# Patient Record
Sex: FEMALE | Race: WHITE | NOT HISPANIC OR LATINO | Employment: OTHER | URBAN - METROPOLITAN AREA
[De-identification: names, ages, dates, MRNs, and addresses within clinical notes are randomized per-mention and may not be internally consistent; named-entity substitution may affect disease eponyms.]

---

## 2020-02-26 ENCOUNTER — HOSPITAL ENCOUNTER (INPATIENT)
Facility: HOSPITAL | Age: 60
LOS: 1 days | Discharge: HOME | DRG: 300 | End: 2020-02-27
Attending: EMERGENCY MEDICINE | Admitting: INTERNAL MEDICINE
Payer: MEDICARE

## 2020-02-26 DIAGNOSIS — Z86.73 HISTORY OF STROKE: ICD-10-CM

## 2020-02-26 DIAGNOSIS — E03.4 HYPOTHYROIDISM DUE TO ACQUIRED ATROPHY OF THYROID: ICD-10-CM

## 2020-02-26 DIAGNOSIS — I82.623 ACUTE DEEP VEIN THROMBOSIS (DVT) OF OTHER VEIN OF BOTH UPPER EXTREMITIES: ICD-10-CM

## 2020-02-26 DIAGNOSIS — I82.623: Primary | ICD-10-CM

## 2020-02-26 DIAGNOSIS — D86.9 SARCOIDOSIS: ICD-10-CM

## 2020-02-26 DIAGNOSIS — D64.9 NORMOCYTIC ANEMIA: ICD-10-CM

## 2020-02-26 DIAGNOSIS — S21.101A OPEN WOUND OF RIGHT CHEST WALL, INITIAL ENCOUNTER: ICD-10-CM

## 2020-02-26 PROBLEM — E78.5 HLD (HYPERLIPIDEMIA): Status: ACTIVE | Noted: 2020-02-26

## 2020-02-26 PROBLEM — E03.9 HYPOTHYROID: Status: ACTIVE | Noted: 2020-02-26

## 2020-02-26 PROBLEM — K21.9 GERD (GASTROESOPHAGEAL REFLUX DISEASE): Status: ACTIVE | Noted: 2020-02-26

## 2020-02-26 LAB
ALBUMIN SERPL BCP-MCNC: 4.2 G/DL (ref 3.5–5.2)
ALP SERPL-CCNC: 95 U/L (ref 55–135)
ALT SERPL W/O P-5'-P-CCNC: 36 U/L (ref 10–44)
ANION GAP SERPL CALC-SCNC: 13 MMOL/L (ref 8–16)
AST SERPL-CCNC: 44 U/L (ref 10–40)
BILIRUB SERPL-MCNC: 0.2 MG/DL (ref 0.1–1)
BUN SERPL-MCNC: 18 MG/DL (ref 8–23)
CALCIUM SERPL-MCNC: 9.6 MG/DL (ref 8.7–10.5)
CHLORIDE SERPL-SCNC: 98 MMOL/L (ref 95–110)
CO2 SERPL-SCNC: 28 MMOL/L (ref 23–29)
CREAT SERPL-MCNC: 0.9 MG/DL (ref 0.5–1.4)
ERYTHROCYTE [DISTWIDTH] IN BLOOD BY AUTOMATED COUNT: 12.5 % (ref 11.5–14.5)
EST. GFR  (AFRICAN AMERICAN): >60 ML/MIN/1.73 M^2
EST. GFR  (NON AFRICAN AMERICAN): >60 ML/MIN/1.73 M^2
GLUCOSE SERPL-MCNC: 93 MG/DL (ref 70–110)
HCT VFR BLD AUTO: 36.4 % (ref 37–48.5)
HGB BLD-MCNC: 11.8 G/DL (ref 12–16)
INR PPP: 0.9 (ref 0.8–1.2)
MCH RBC QN AUTO: 30.3 PG (ref 27–31)
MCHC RBC AUTO-ENTMCNC: 32.4 G/DL (ref 32–36)
MCV RBC AUTO: 94 FL (ref 82–98)
PLATELET # BLD AUTO: 305 K/UL (ref 150–350)
PMV BLD AUTO: 9.5 FL (ref 9.2–12.9)
POTASSIUM SERPL-SCNC: 3.6 MMOL/L (ref 3.5–5.1)
PROT SERPL-MCNC: 7.7 G/DL (ref 6–8.4)
PROTHROMBIN TIME: 10.2 SEC (ref 9–12.5)
RBC # BLD AUTO: 3.89 M/UL (ref 4–5.4)
SODIUM SERPL-SCNC: 139 MMOL/L (ref 136–145)
WBC # BLD AUTO: 6 K/UL (ref 3.9–12.7)

## 2020-02-26 PROCEDURE — 80053 COMPREHEN METABOLIC PANEL: CPT

## 2020-02-26 PROCEDURE — 63600175 PHARM REV CODE 636 W HCPCS: Performed by: EMERGENCY MEDICINE

## 2020-02-26 PROCEDURE — 99285 EMERGENCY DEPT VISIT HI MDM: CPT | Mod: 25

## 2020-02-26 PROCEDURE — 85027 COMPLETE CBC AUTOMATED: CPT

## 2020-02-26 PROCEDURE — 11000001 HC ACUTE MED/SURG PRIVATE ROOM

## 2020-02-26 PROCEDURE — 25500020 PHARM REV CODE 255: Performed by: INTERNAL MEDICINE

## 2020-02-26 PROCEDURE — 96372 THER/PROPH/DIAG INJ SC/IM: CPT | Mod: 59

## 2020-02-26 PROCEDURE — 25000003 PHARM REV CODE 250: Performed by: STUDENT IN AN ORGANIZED HEALTH CARE EDUCATION/TRAINING PROGRAM

## 2020-02-26 PROCEDURE — 85610 PROTHROMBIN TIME: CPT

## 2020-02-26 RX ORDER — SODIUM CHLORIDE 0.9 % (FLUSH) 0.9 %
10 SYRINGE (ML) INJECTION
Status: DISCONTINUED | OUTPATIENT
Start: 2020-02-26 | End: 2020-02-27 | Stop reason: HOSPADM

## 2020-02-26 RX ORDER — FLUTICASONE PROPIONATE 50 MCG
1 SPRAY, SUSPENSION (ML) NASAL DAILY
Status: DISCONTINUED | OUTPATIENT
Start: 2020-02-27 | End: 2020-02-27 | Stop reason: HOSPADM

## 2020-02-26 RX ORDER — LANOLIN ALCOHOL/MO/W.PET/CERES
400 CREAM (GRAM) TOPICAL 2 TIMES DAILY
Status: DISCONTINUED | OUTPATIENT
Start: 2020-02-26 | End: 2020-02-27 | Stop reason: HOSPADM

## 2020-02-26 RX ORDER — HYDROXYCHLOROQUINE SULFATE 200 MG/1
200 TABLET, FILM COATED ORAL DAILY
Status: DISCONTINUED | OUTPATIENT
Start: 2020-02-27 | End: 2020-02-27 | Stop reason: HOSPADM

## 2020-02-26 RX ORDER — FUROSEMIDE 40 MG/1
80 TABLET ORAL DAILY
Status: DISCONTINUED | OUTPATIENT
Start: 2020-02-27 | End: 2020-02-27 | Stop reason: HOSPADM

## 2020-02-26 RX ORDER — METOLAZONE 2.5 MG/1
2.5 TABLET ORAL
Status: ON HOLD | COMMUNITY
End: 2020-02-27 | Stop reason: HOSPADM

## 2020-02-26 RX ORDER — CLOPIDOGREL BISULFATE 75 MG/1
75 TABLET ORAL DAILY
Status: DISCONTINUED | OUTPATIENT
Start: 2020-02-27 | End: 2020-02-27 | Stop reason: HOSPADM

## 2020-02-26 RX ORDER — AZELASTINE 1 MG/ML
1 SPRAY, METERED NASAL 2 TIMES DAILY
Status: DISCONTINUED | OUTPATIENT
Start: 2020-02-26 | End: 2020-02-27 | Stop reason: HOSPADM

## 2020-02-26 RX ORDER — SERTRALINE HYDROCHLORIDE 50 MG/1
50 TABLET, FILM COATED ORAL DAILY
Status: DISCONTINUED | OUTPATIENT
Start: 2020-02-27 | End: 2020-02-27 | Stop reason: HOSPADM

## 2020-02-26 RX ORDER — CLOPIDOGREL BISULFATE 75 MG/1
75 TABLET ORAL DAILY
COMMUNITY

## 2020-02-26 RX ORDER — OXYCODONE AND ACETAMINOPHEN 5; 325 MG/1; MG/1
1 TABLET ORAL EVERY 4 HOURS PRN
COMMUNITY
Start: 2020-01-18 | End: 2021-06-21

## 2020-02-26 RX ORDER — HYDROXYCHLOROQUINE SULFATE 200 MG/1
200 TABLET, FILM COATED ORAL DAILY
COMMUNITY

## 2020-02-26 RX ORDER — PANTOPRAZOLE SODIUM 40 MG/1
40 TABLET, DELAYED RELEASE ORAL DAILY
Status: DISCONTINUED | OUTPATIENT
Start: 2020-02-27 | End: 2020-02-27 | Stop reason: HOSPADM

## 2020-02-26 RX ORDER — CLOTRIMAZOLE 10 MG/1
10 LOZENGE ORAL; TOPICAL 2 TIMES DAILY
COMMUNITY
End: 2021-06-21

## 2020-02-26 RX ORDER — ENOXAPARIN SODIUM 100 MG/ML
60 INJECTION SUBCUTANEOUS
Status: COMPLETED | OUTPATIENT
Start: 2020-02-26 | End: 2020-02-26

## 2020-02-26 RX ORDER — RIBOFLAVIN (VITAMIN B2) 400 MG
400 TABLET ORAL
COMMUNITY
Start: 2019-05-01 | End: 2020-04-30

## 2020-02-26 RX ORDER — TRAMADOL HYDROCHLORIDE 50 MG/1
100 TABLET ORAL DAILY
COMMUNITY
End: 2021-06-21

## 2020-02-26 RX ORDER — EZETIMIBE 10 MG/1
10 TABLET ORAL NIGHTLY
COMMUNITY

## 2020-02-26 RX ORDER — CLONAZEPAM 1 MG/1
1 TABLET ORAL 2 TIMES DAILY PRN
COMMUNITY

## 2020-02-26 RX ORDER — PROMETHAZINE HYDROCHLORIDE 25 MG/1
25 TABLET ORAL EVERY 6 HOURS PRN
COMMUNITY
End: 2021-06-21

## 2020-02-26 RX ORDER — CETIRIZINE HYDROCHLORIDE 10 MG/1
10 TABLET ORAL NIGHTLY
COMMUNITY
End: 2021-06-21

## 2020-02-26 RX ORDER — EZETIMIBE 10 MG/1
10 TABLET ORAL NIGHTLY
Status: DISCONTINUED | OUTPATIENT
Start: 2020-02-26 | End: 2020-02-27 | Stop reason: HOSPADM

## 2020-02-26 RX ORDER — BISACODYL 5 MG
5 TABLET, DELAYED RELEASE (ENTERIC COATED) ORAL DAILY PRN
COMMUNITY

## 2020-02-26 RX ORDER — FAMOTIDINE 20 MG/1
20 TABLET, FILM COATED ORAL 2 TIMES DAILY
Status: DISCONTINUED | OUTPATIENT
Start: 2020-02-26 | End: 2020-02-27 | Stop reason: HOSPADM

## 2020-02-26 RX ORDER — ENOXAPARIN SODIUM 100 MG/ML
60 INJECTION SUBCUTANEOUS
Status: DISCONTINUED | OUTPATIENT
Start: 2020-02-26 | End: 2020-02-26

## 2020-02-26 RX ORDER — SERTRALINE HYDROCHLORIDE 100 MG/1
100 TABLET, FILM COATED ORAL DAILY
COMMUNITY

## 2020-02-26 RX ORDER — LANOLIN ALCOHOL/MO/W.PET/CERES
400 CREAM (GRAM) TOPICAL 2 TIMES DAILY
COMMUNITY
End: 2021-06-21

## 2020-02-26 RX ORDER — CYCLOSPORINE 0.5 MG/ML
1 EMULSION OPHTHALMIC 2 TIMES DAILY
COMMUNITY

## 2020-02-26 RX ORDER — TIZANIDINE HYDROCHLORIDE 2 MG/1
4 CAPSULE, GELATIN COATED ORAL 2 TIMES DAILY
COMMUNITY
End: 2021-06-21

## 2020-02-26 RX ORDER — LEVOTHYROXINE SODIUM 137 UG/1
137 TABLET ORAL DAILY
COMMUNITY
End: 2021-06-21

## 2020-02-26 RX ORDER — CLONAZEPAM 0.5 MG/1
1 TABLET ORAL 2 TIMES DAILY PRN
Status: DISCONTINUED | OUTPATIENT
Start: 2020-02-26 | End: 2020-02-27 | Stop reason: HOSPADM

## 2020-02-26 RX ORDER — CETIRIZINE HYDROCHLORIDE 10 MG/1
10 TABLET ORAL NIGHTLY
Status: DISCONTINUED | OUTPATIENT
Start: 2020-02-26 | End: 2020-02-27 | Stop reason: HOSPADM

## 2020-02-26 RX ORDER — FUROSEMIDE 40 MG/1
40 TABLET ORAL EVERY OTHER DAY
COMMUNITY

## 2020-02-26 RX ORDER — LEVOTHYROXINE SODIUM 137 UG/1
137 TABLET ORAL
Status: DISCONTINUED | OUTPATIENT
Start: 2020-02-27 | End: 2020-02-27 | Stop reason: HOSPADM

## 2020-02-26 RX ORDER — LANOLIN ALCOHOL/MO/W.PET/CERES
1000 CREAM (GRAM) TOPICAL
COMMUNITY
End: 2021-06-21

## 2020-02-26 RX ORDER — TIZANIDINE 4 MG/1
4 TABLET ORAL 2 TIMES DAILY
Status: DISCONTINUED | OUTPATIENT
Start: 2020-02-26 | End: 2020-02-27 | Stop reason: HOSPADM

## 2020-02-26 RX ORDER — POLYETHYLENE GLYCOL 3350 17 G/17G
17 POWDER, FOR SOLUTION ORAL DAILY
COMMUNITY

## 2020-02-26 RX ORDER — POLYETHYLENE GLYCOL 3350 17 G/17G
17 POWDER, FOR SOLUTION ORAL DAILY
Status: DISCONTINUED | OUTPATIENT
Start: 2020-02-27 | End: 2020-02-27 | Stop reason: HOSPADM

## 2020-02-26 RX ORDER — POTASSIUM CHLORIDE 20 MEQ/1
2 TABLET, EXTENDED RELEASE ORAL
Status: ON HOLD | COMMUNITY
End: 2020-02-27 | Stop reason: HOSPADM

## 2020-02-26 RX ORDER — CLOTRIMAZOLE 10 MG/1
10 LOZENGE ORAL; TOPICAL 2 TIMES DAILY
Status: DISCONTINUED | OUTPATIENT
Start: 2020-02-26 | End: 2020-02-27 | Stop reason: HOSPADM

## 2020-02-26 RX ORDER — HYDROXYZINE HYDROCHLORIDE 25 MG/1
25 TABLET, FILM COATED ORAL EVERY 6 HOURS PRN
Status: ON HOLD | COMMUNITY
End: 2020-02-27 | Stop reason: HOSPADM

## 2020-02-26 RX ORDER — TAMSULOSIN HYDROCHLORIDE 0.4 MG/1
2 CAPSULE ORAL DAILY
COMMUNITY

## 2020-02-26 RX ORDER — POTASSIUM CHLORIDE 1.5 G/1.58G
40 POWDER, FOR SOLUTION ORAL
Status: ON HOLD | COMMUNITY
End: 2020-02-27 | Stop reason: HOSPADM

## 2020-02-26 RX ORDER — AZELASTINE 1 MG/ML
1 SPRAY, METERED NASAL 2 TIMES DAILY
COMMUNITY

## 2020-02-26 RX ORDER — OMEPRAZOLE 20 MG/1
40 CAPSULE, DELAYED RELEASE ORAL DAILY
COMMUNITY
End: 2021-06-21

## 2020-02-26 RX ORDER — FLUTICASONE PROPIONATE 50 MCG
1 SPRAY, SUSPENSION (ML) NASAL DAILY
COMMUNITY

## 2020-02-26 RX ORDER — ENOXAPARIN SODIUM 100 MG/ML
1 INJECTION SUBCUTANEOUS
Status: DISCONTINUED | OUTPATIENT
Start: 2020-02-27 | End: 2020-02-27 | Stop reason: HOSPADM

## 2020-02-26 RX ORDER — TAMSULOSIN HYDROCHLORIDE 0.4 MG/1
0.4 CAPSULE ORAL DAILY
Status: DISCONTINUED | OUTPATIENT
Start: 2020-02-27 | End: 2020-02-27 | Stop reason: HOSPADM

## 2020-02-26 RX ADMIN — CETIRIZINE HYDROCHLORIDE 10 MG: 10 TABLET, FILM COATED ORAL at 11:02

## 2020-02-26 RX ADMIN — TIZANIDINE 4 MG: 4 TABLET ORAL at 11:02

## 2020-02-26 RX ADMIN — IOHEXOL 75 ML: 350 INJECTION, SOLUTION INTRAVENOUS at 10:02

## 2020-02-26 RX ADMIN — CLONAZEPAM 1 MG: 0.5 TABLET ORAL at 11:02

## 2020-02-26 RX ADMIN — FAMOTIDINE 20 MG: 20 TABLET ORAL at 11:02

## 2020-02-26 RX ADMIN — ENOXAPARIN SODIUM 60 MG: 100 INJECTION SUBCUTANEOUS at 09:02

## 2020-02-26 RX ADMIN — Medication 400 MG: at 11:02

## 2020-02-26 RX ADMIN — EZETIMIBE 10 MG: 10 TABLET ORAL at 11:02

## 2020-02-26 RX ADMIN — CLOTRIMAZOLE 10 MG: 10 LOZENGE ORAL; TOPICAL at 11:02

## 2020-02-27 VITALS
SYSTOLIC BLOOD PRESSURE: 132 MMHG | WEIGHT: 143.31 LBS | RESPIRATION RATE: 17 BRPM | TEMPERATURE: 97 F | OXYGEN SATURATION: 96 % | HEART RATE: 50 BPM | BODY MASS INDEX: 28.13 KG/M2 | HEIGHT: 60 IN | DIASTOLIC BLOOD PRESSURE: 61 MMHG

## 2020-02-27 PROBLEM — S21.101A OPEN WOUND OF RIGHT CHEST WALL: Status: ACTIVE | Noted: 2020-02-27

## 2020-02-27 LAB
ALBUMIN SERPL BCP-MCNC: 3.6 G/DL (ref 3.5–5.2)
ALP SERPL-CCNC: 79 U/L (ref 55–135)
ALT SERPL W/O P-5'-P-CCNC: 30 U/L (ref 10–44)
ANION GAP SERPL CALC-SCNC: 8 MMOL/L (ref 8–16)
AST SERPL-CCNC: 35 U/L (ref 10–40)
BASOPHILS # BLD AUTO: 0.05 K/UL (ref 0–0.2)
BASOPHILS NFR BLD: 1 % (ref 0–1.9)
BILIRUB SERPL-MCNC: 0.3 MG/DL (ref 0.1–1)
BUN SERPL-MCNC: 13 MG/DL (ref 8–23)
CALCIUM SERPL-MCNC: 9.2 MG/DL (ref 8.7–10.5)
CHLORIDE SERPL-SCNC: 102 MMOL/L (ref 95–110)
CO2 SERPL-SCNC: 29 MMOL/L (ref 23–29)
CREAT SERPL-MCNC: 0.8 MG/DL (ref 0.5–1.4)
DIFFERENTIAL METHOD: ABNORMAL
EOSINOPHIL # BLD AUTO: 0.4 K/UL (ref 0–0.5)
EOSINOPHIL NFR BLD: 8.1 % (ref 0–8)
ERYTHROCYTE [DISTWIDTH] IN BLOOD BY AUTOMATED COUNT: 12.4 % (ref 11.5–14.5)
EST. GFR  (AFRICAN AMERICAN): >60 ML/MIN/1.73 M^2
EST. GFR  (NON AFRICAN AMERICAN): >60 ML/MIN/1.73 M^2
ESTIMATED AVG GLUCOSE: 100 MG/DL (ref 68–131)
GLUCOSE SERPL-MCNC: 93 MG/DL (ref 70–110)
HBA1C MFR BLD HPLC: 5.1 % (ref 4–5.6)
HCT VFR BLD AUTO: 34.2 % (ref 37–48.5)
HGB BLD-MCNC: 11.4 G/DL (ref 12–16)
IMM GRANULOCYTES # BLD AUTO: 0.02 K/UL (ref 0–0.04)
IMM GRANULOCYTES NFR BLD AUTO: 0.4 % (ref 0–0.5)
LYMPHOCYTES # BLD AUTO: 1.5 K/UL (ref 1–4.8)
LYMPHOCYTES NFR BLD: 29.9 % (ref 18–48)
MAGNESIUM SERPL-MCNC: 2.4 MG/DL (ref 1.6–2.6)
MCH RBC QN AUTO: 30.6 PG (ref 27–31)
MCHC RBC AUTO-ENTMCNC: 33.3 G/DL (ref 32–36)
MCV RBC AUTO: 92 FL (ref 82–98)
MONOCYTES # BLD AUTO: 0.4 K/UL (ref 0.3–1)
MONOCYTES NFR BLD: 8.9 % (ref 4–15)
NEUTROPHILS # BLD AUTO: 2.5 K/UL (ref 1.8–7.7)
NEUTROPHILS NFR BLD: 51.7 % (ref 38–73)
NRBC BLD-RTO: 0 /100 WBC
PLATELET # BLD AUTO: 292 K/UL (ref 150–350)
PMV BLD AUTO: 9.6 FL (ref 9.2–12.9)
POTASSIUM SERPL-SCNC: 3.4 MMOL/L (ref 3.5–5.1)
PROT SERPL-MCNC: 6.6 G/DL (ref 6–8.4)
RBC # BLD AUTO: 3.72 M/UL (ref 4–5.4)
SODIUM SERPL-SCNC: 139 MMOL/L (ref 136–145)
WBC # BLD AUTO: 4.92 K/UL (ref 3.9–12.7)

## 2020-02-27 PROCEDURE — 83735 ASSAY OF MAGNESIUM: CPT

## 2020-02-27 PROCEDURE — 85025 COMPLETE CBC W/AUTO DIFF WBC: CPT

## 2020-02-27 PROCEDURE — 36415 COLL VENOUS BLD VENIPUNCTURE: CPT

## 2020-02-27 PROCEDURE — 99223 1ST HOSP IP/OBS HIGH 75: CPT | Mod: ,,, | Performed by: INTERNAL MEDICINE

## 2020-02-27 PROCEDURE — 25000003 PHARM REV CODE 250: Performed by: STUDENT IN AN ORGANIZED HEALTH CARE EDUCATION/TRAINING PROGRAM

## 2020-02-27 PROCEDURE — 80053 COMPREHEN METABOLIC PANEL: CPT

## 2020-02-27 PROCEDURE — 99223 PR INITIAL HOSPITAL CARE,LEVL III: ICD-10-PCS | Mod: ,,, | Performed by: INTERNAL MEDICINE

## 2020-02-27 PROCEDURE — 83036 HEMOGLOBIN GLYCOSYLATED A1C: CPT

## 2020-02-27 PROCEDURE — 25000242 PHARM REV CODE 250 ALT 637 W/ HCPCS: Performed by: STUDENT IN AN ORGANIZED HEALTH CARE EDUCATION/TRAINING PROGRAM

## 2020-02-27 PROCEDURE — 63600175 PHARM REV CODE 636 W HCPCS: Performed by: STUDENT IN AN ORGANIZED HEALTH CARE EDUCATION/TRAINING PROGRAM

## 2020-02-27 RX ORDER — POTASSIUM CHLORIDE 20 MEQ/1
40 TABLET, EXTENDED RELEASE ORAL ONCE
Status: COMPLETED | OUTPATIENT
Start: 2020-02-27 | End: 2020-02-27

## 2020-02-27 RX ORDER — SODIUM,POTASSIUM PHOSPHATES 280-250MG
1 POWDER IN PACKET (EA) ORAL ONCE
Status: DISCONTINUED | OUTPATIENT
Start: 2020-02-27 | End: 2020-02-27

## 2020-02-27 RX ORDER — ACETAMINOPHEN 325 MG/1
650 TABLET ORAL EVERY 6 HOURS PRN
Status: DISCONTINUED | OUTPATIENT
Start: 2020-02-27 | End: 2020-02-27 | Stop reason: HOSPADM

## 2020-02-27 RX ORDER — POTASSIUM CHLORIDE 20 MEQ/1
20 TABLET, EXTENDED RELEASE ORAL 2 TIMES DAILY
Start: 2020-02-27 | End: 2021-06-21

## 2020-02-27 RX ORDER — ENOXAPARIN SODIUM 100 MG/ML
1 INJECTION SUBCUTANEOUS EVERY 12 HOURS
Start: 2020-02-27 | End: 2020-05-28 | Stop reason: ALTCHOICE

## 2020-02-27 RX ADMIN — SERTRALINE HYDROCHLORIDE 50 MG: 50 TABLET ORAL at 09:02

## 2020-02-27 RX ADMIN — ACETAMINOPHEN 650 MG: 325 TABLET ORAL at 09:02

## 2020-02-27 RX ADMIN — LEVOTHYROXINE SODIUM 137 MCG: 137 TABLET ORAL at 05:02

## 2020-02-27 RX ADMIN — FUROSEMIDE 80 MG: 40 TABLET ORAL at 09:02

## 2020-02-27 RX ADMIN — AZELASTINE HYDROCHLORIDE 137 MCG: 137 SPRAY, METERED NASAL at 09:02

## 2020-02-27 RX ADMIN — HYDROXYCHLOROQUINE SULFATE 200 MG: 200 TABLET, FILM COATED ORAL at 09:02

## 2020-02-27 RX ADMIN — TAMSULOSIN HYDROCHLORIDE 0.4 MG: 0.4 CAPSULE ORAL at 09:02

## 2020-02-27 RX ADMIN — ENOXAPARIN SODIUM 70 MG: 100 INJECTION SUBCUTANEOUS at 09:02

## 2020-02-27 RX ADMIN — POTASSIUM CHLORIDE 40 MEQ: 1500 TABLET, EXTENDED RELEASE ORAL at 09:02

## 2020-02-27 RX ADMIN — FAMOTIDINE 20 MG: 20 TABLET ORAL at 09:02

## 2020-02-27 RX ADMIN — FLUTICASONE PROPIONATE 50 MCG: 50 SPRAY, METERED NASAL at 09:02

## 2020-02-27 RX ADMIN — POLYETHYLENE GLYCOL 3350 17 G: 17 POWDER, FOR SOLUTION ORAL at 09:02

## 2020-02-27 RX ADMIN — CLOPIDOGREL BISULFATE 75 MG: 75 TABLET ORAL at 09:02

## 2020-02-27 RX ADMIN — PANTOPRAZOLE SODIUM 40 MG: 40 TABLET, DELAYED RELEASE ORAL at 09:02

## 2020-02-27 RX ADMIN — Medication 400 MG: at 09:02

## 2020-02-27 RX ADMIN — TIZANIDINE 4 MG: 4 TABLET ORAL at 09:02

## 2020-02-27 RX ADMIN — CLOTRIMAZOLE 10 MG: 10 LOZENGE ORAL; TOPICAL at 09:02

## 2020-02-27 NOTE — PROGRESS NOTES
Wound care nurse consult for right chest wall wound.  See consult note also, assessment below.     02/27/20 1100        Wound 02/27/20 0930 Non pressure chronic ulcer upper Chest   Date First Assessed/Time First Assessed: 02/27/20 0930   Pre-existing: Yes  Primary Wound Type: Non pressure chronic ulcer  Side: Right  Orientation: upper  Location: Chest   Wound WDL ex   Dressing Appearance Intact   Drainage Amount Small   Drainage Characteristics/Odor No odor;Creamy;Green   Appearance Red;Moist  (hypergranulated)   Tissue loss description Partial thickness   Red (%), Wound Tissue Color 100 %   Periwound Area Pink;Intact   Wound Edges Open;Defined   Wound Length (cm) 1 cm   Wound Width (cm) 2 cm   Wound Depth (cm) 0.5 cm   Wound Volume (cm^3) 1 cm^3   Wound Surface Area (cm^2) 2 cm^2   Care Cleansed with:;Other (see comments)  ([place])   Dressing Hydrofiber   Dressing Change Due 02/28/20

## 2020-02-27 NOTE — SUBJECTIVE & OBJECTIVE
Past Medical History:   Diagnosis Date    Aicd lead infection     Aortic coarctation     Bicuspid aortic valve     Bradycardia     Cardiac arrest 2001    Migraine headache     Neuromuscular dysfunction of bladder     Sarcoidosis     Stroke        History reviewed. No pertinent surgical history.    Review of patient's allergies indicates:   Allergen Reactions    Butorphanol tartrate Other (See Comments)    Erythromycin base     Gabapentin Other (See Comments)    Carbamazepine Palpitations    Epinephrine Palpitations    Erythromycin Nausea Only    Statins-hmg-coa reductase inhibitors Rash       No current facility-administered medications on file prior to encounter.      Current Outpatient Medications on File Prior to Encounter   Medication Sig    azelastine (ASTELIN) 137 mcg (0.1 %) nasal spray 1 spray 2 (two) times daily.    bisacodyL (DULCOLAX) 5 mg EC tablet Take 5 mg by mouth daily as needed.    cetirizine (ZYRTEC) 10 MG tablet Take 10 mg by mouth every evening.    clonazePAM (KLONOPIN) 1 MG tablet Take 1 mg by mouth 2 (two) times daily as needed.    clopidogreL (PLAVIX) 75 mg tablet Take 75 mg by mouth once daily.    clotrimazole (MYCELEX) 10 mg antelmo Take 10 mg by mouth 2 (two) times daily.    cyanocobalamin (VITAMIN B-12) 1000 MCG tablet 1,000 mcg every 30 days. 1ml IM monthly    cycloSPORINE (RESTASIS) 0.05 % ophthalmic emulsion 1 drop 2 (two) times daily.    ezetimibe (ZETIA) 10 mg tablet Take 10 mg by mouth every evening.    fluticasone propionate (FLONASE) 50 mcg/actuation nasal spray 1 spray once daily.    furosemide (LASIX) 80 MG tablet Take 80 mg by mouth once daily.    hydroxychloroquine (PLAQUENIL) 200 mg tablet Take 200 mg by mouth once daily.    hydrOXYzine HCl (ATARAX) 25 MG tablet Take 25 mg by mouth every 6 (six) hours as needed.    levothyroxine (SYNTHROID) 137 MCG Tab tablet Take 137 mcg by mouth once daily.    magnesium oxide (MAG-OX) 400 mg (241.3 mg magnesium)  tablet Take 400 mg by mouth 2 (two) times daily.    metOLazone (ZAROXOLYN) 2.5 MG tablet Take 2.5 mg by mouth. Give on Tuesday and Saturday only    omeprazole (PRILOSEC) 20 MG capsule Take 40 mg by mouth once daily.     oxyCODONE-acetaminophen (PERCOCET) 5-325 mg per tablet Take 1 tablet by mouth every 4 (four) hours as needed.    polyethylene glycol (GLYCOLAX) 17 gram/dose powder Take 17 g by mouth once daily.    potassium chloride (KLOR-CON) 20 mEq Pack Take 40 mEq by mouth. TID on Tuesday and Saturday    potassium chloride SA (K-DUR,KLOR-CON) 20 MEQ tablet Take 2 tablets by mouth. Every Monday, Wednesday, Friday, Sunday    promethazine (PHENERGAN) 25 MG tablet Take 25 mg by mouth every 6 (six) hours as needed.    ranitidine (ZANTAC) 300 MG tablet 300 mg every evening.    riboflavin, vitamin B2, 400 mg Tab Take 400 mg by mouth.    sertraline (ZOLOFT) 50 MG tablet Take 50 mg by mouth once daily.    tamsulosin (FLOMAX) 0.4 mg Cap 2 capsules once daily.    tiZANidine 2 mg Cap Take 4 mg by mouth 2 (two) times daily.    traMADol (ULTRAM) 50 mg tablet Take 100 mg by mouth once daily.    VITAMIN B COMPLEX ORAL Take 1 tablet by mouth once daily.     Family History     Problem Relation (Age of Onset)    Cancer Mother    Heart disease Father        Tobacco Use    Smoking status: Never Smoker    Smokeless tobacco: Never Used   Substance and Sexual Activity    Alcohol use: Never     Frequency: Never    Drug use: Never    Sexual activity: Not on file     Review of Systems   Constitution: Negative. Negative for diaphoresis.   HENT: Negative.    Eyes: Negative.    Cardiovascular: Negative for chest pain, irregular heartbeat, leg swelling, near-syncope, orthopnea, palpitations, paroxysmal nocturnal dyspnea and syncope.   Respiratory: Negative for cough and shortness of breath.    Endocrine: Negative.    Hematologic/Lymphatic: Negative for bleeding problem. Does not bruise/bleed easily.   Skin: Negative for  color change.   Musculoskeletal: Positive for arthritis and myalgias.   Gastrointestinal: Negative.  Negative for bloating, hematemesis, hematochezia, nausea and vomiting.   Genitourinary: Negative.    Neurological: Negative.    Psychiatric/Behavioral: Negative.    Allergic/Immunologic: Negative.      Objective:     Vital Signs (Most Recent):  Temp: 97 °F (36.1 °C) (02/27/20 0821)  Pulse: (!) 52 (02/27/20 1000)  Resp: 18 (02/27/20 0821)  BP: (!) 140/82 (02/27/20 1000)  SpO2: 96 % (02/26/20 2202) Vital Signs (24h Range):  Temp:  [97 °F (36.1 °C)-98.3 °F (36.8 °C)] 97 °F (36.1 °C)  Pulse:  [50-63] 52  Resp:  [14-25] 18  SpO2:  [96 %-100 %] 96 %  BP: (100-173)/(56-82) 140/82     Weight: 65 kg (143 lb 4.8 oz)  Body mass index is 27.99 kg/m².    SpO2: 96 %  O2 Device (Oxygen Therapy): room air      Intake/Output Summary (Last 24 hours) at 2/27/2020 1107  Last data filed at 2/27/2020 0543  Gross per 24 hour   Intake 205 ml   Output 400 ml   Net -195 ml       Lines/Drains/Airways     Peripheral Intravenous Line                 Peripheral IV - Single Lumen 02/26/20 1855 20 G Left Forearm less than 1 day                Physical Exam   Constitutional: She is oriented to person, place, and time. She appears well-developed and well-nourished. No distress.   HENT:   Head: Atraumatic.   Eyes: Right eye exhibits no discharge. Left eye exhibits no discharge.   Neck: No JVD present.   Cardiovascular: Normal rate and regular rhythm. Exam reveals no gallop and no friction rub.   Murmur heard.  Pulmonary/Chest: Effort normal and breath sounds normal.   Abdominal: Soft. Bowel sounds are normal.   Musculoskeletal: She exhibits edema (mild posterior RUE).   Neurological: She is alert and oriented to person, place, and time.   Skin: Skin is warm and dry. She is not diaphoretic.   Right chest wall wound    Psychiatric: She has a normal mood and affect. Her behavior is normal.       Significant Labs:   BMP:   Recent Labs   Lab 02/26/20  8505  02/27/20  0423   GLU 93 93    139   K 3.6 3.4*   CL 98 102   CO2 28 29   BUN 18 13   CREATININE 0.9 0.8   CALCIUM 9.6 9.2   MG  --  2.4   , CMP   Recent Labs   Lab 02/26/20 1848 02/27/20  0423    139   K 3.6 3.4*   CL 98 102   CO2 28 29   GLU 93 93   BUN 18 13   CREATININE 0.9 0.8   CALCIUM 9.6 9.2   PROT 7.7 6.6   ALBUMIN 4.2 3.6   BILITOT 0.2 0.3   ALKPHOS 95 79   AST 44* 35   ALT 36 30   ANIONGAP 13 8   ESTGFRAFRICA >60 >60   EGFRNONAA >60 >60   , CBC   Recent Labs   Lab 02/26/20 1848 02/27/20 0422   WBC 6.00 4.92   HGB 11.8* 11.4*   HCT 36.4* 34.2*    292   , INR   Recent Labs   Lab 02/26/20 1848   INR 0.9   , Lipid Panel No results for input(s): CHOL, HDL, LDLCALC, TRIG, CHOLHDL in the last 48 hours., Troponin No results for input(s): TROPONINI in the last 48 hours. and All pertinent lab results from the last 24 hours have been reviewed.    Significant Imaging: Echocardiogram: 2D echo with color flow doppler: No results found for this or any previous visit. and Transthoracic echo (TTE) complete (Cupid Only): No results found for this or any previous visit.

## 2020-02-27 NOTE — PLAN OF CARE
Patient A&O x 4. C/o pain right upper arm. PRN tylenol given. With order for discharge. PIV removed. Report given to nurse Shaylee.

## 2020-02-27 NOTE — ASSESSMENT & PLAN NOTE
Patient with hypercoagulable state followed by hematology in the past  DVT to BUE in setting of Left then Right midlines for abx administration- feel her clots are subacute given timing of lines  Patient mostly asymptomatic and swelling was noted incidentally by another NH resident. Patient has had pain to her right shoulder since her ICD was removed and she has a persistent right chest wall wound  No indication for catheter directed thrombolysis or clot retrieval   INR difficult to regulate with coumadin in the past  Heparin not the most ideal choice as outpatient  Recommend initiation of DOAC- Eliquis vs Xarelto   Refer back to hematology as an outpatient to determine duration of treatment, feel she will likely need lifelong anticoagulation

## 2020-02-27 NOTE — PLAN OF CARE
"Admit completed per flowsheet. Patient's questions answered.  Pt instructed on VTE, diet, I&Os, tests, fall precautions and medications. Understanding verbalized. Patient has complaint of pain in her right upper arm rated a "4" at this time.  " "I need to have my thumb re evaluated"  unable to follow up with ortho outpatient- c/o throbbing pain to right thumb on 4/10

## 2020-02-27 NOTE — ED PROVIDER NOTES
"Encounter Date: 2/26/2020       History     Chief Complaint   Patient presents with    Deep Vein Thrombosis     pt was sent from [facility] after ultrasound that was performed on Monday showed DVT on her right arm.      Nishi Pérez is a 59 y.o. female who  has a past medical history of Aicd lead infection, Aortic coarctation, Bicuspid aortic valve, Bradycardia, Cardiac arrest (2001), Migraine headache, Neuromuscular dysfunction of bladder, Sarcoidosis, and Stroke.    The patient presents to the ED due to R arm pain and swelling.   Patient reports symptoms started over the weekend, about 5 days ago.   She reports multiple recent admissions to the [facility] and [facility] due to an infected pacemaker lead.  She had the pacemaker removed, and received IV antibiotics through a PICC line to the left arm over the last several weeks.  We PICC line was removed a few weeks ago.  She denies any associated rash, chest pain, shortness of breath, abdominal pain, fever, nausea/vomiting, leg pain/swelling, or any other associated symptoms.  She has a history of stroke, and was previously on "heparin shots" but is currently only on Plavix.  She was admitted to a nursing home after discharge from the hospital.  She underwent an ultrasound of her right arm, which showed a DVT.  She was sent to the ER for further management.  She denies any other complaints currently.        Review of patient's allergies indicates:   Allergen Reactions    Butorphanol tartrate Other (See Comments)    Erythromycin base     Gabapentin Other (See Comments)    Carbamazepine Palpitations    Epinephrine Palpitations    Erythromycin Nausea Only    Statins-hmg-coa reductase inhibitors Rash     Past Medical History:   Diagnosis Date    Aicd lead infection     Aortic coarctation     Bicuspid aortic valve     Bradycardia     Cardiac arrest 2001    Migraine headache     Neuromuscular dysfunction of bladder     Sarcoidosis     Stroke  "     History reviewed. No pertinent surgical history.  Family History   Problem Relation Age of Onset    Cancer Mother     Heart disease Father      Social History     Tobacco Use    Smoking status: Never Smoker    Smokeless tobacco: Never Used   Substance Use Topics    Alcohol use: Never     Frequency: Never    Drug use: Never     Review of Systems   Constitutional: Negative for chills and fever.   HENT: Negative for sore throat.    Respiratory: Negative for cough and shortness of breath.    Cardiovascular: Negative for chest pain and palpitations.   Gastrointestinal: Negative for nausea and vomiting.   Genitourinary: Negative for dysuria, frequency and urgency.   Musculoskeletal: Positive for arthralgias and myalgias. Negative for back pain, gait problem, joint swelling, neck pain and neck stiffness.   Skin: Negative for rash and wound.   Neurological: Negative for syncope and weakness.   Hematological: Does not bruise/bleed easily.   Psychiatric/Behavioral: Negative for agitation, behavioral problems and confusion.       Physical Exam     Initial Vitals [02/26/20 1741]   BP Pulse Resp Temp SpO2   100/72 (!) 50 (!) 24 97.8 °F (36.6 °C) 98 %      MAP       --         Physical Exam    Nursing note and vitals reviewed.  Constitutional: She appears well-developed and well-nourished. She is not diaphoretic. No distress.   HENT:   Head: Normocephalic and atraumatic.   Mouth/Throat: Oropharynx is clear and moist.   Eyes: EOM are normal. Pupils are equal, round, and reactive to light.   Neck: No tracheal deviation present.   Cardiovascular: Normal rate, regular rhythm, normal heart sounds and intact distal pulses.   Pulmonary/Chest: Breath sounds normal. No stridor. No respiratory distress. She has no wheezes.   Abdominal: Soft. Bowel sounds are normal. She exhibits no distension and no mass. There is no tenderness.   Musculoskeletal: Normal range of motion. She exhibits no edema.   Tenderness about the right upper  arm and right forearm.  No significant edema, erythema, or bruising.   Neurological: She is alert and oriented to person, place, and time. She has normal strength. No cranial nerve deficit or sensory deficit.   Skin: Skin is warm and dry. Capillary refill takes less than 2 seconds. No pallor.   Psychiatric: She has a normal mood and affect. Her behavior is normal. Thought content normal.         ED Course   Procedures  Labs Reviewed   CBC WITHOUT DIFFERENTIAL - Abnormal; Notable for the following components:       Result Value    RBC 3.89 (*)     Hemoglobin 11.8 (*)     Hematocrit 36.4 (*)     All other components within normal limits   COMPREHENSIVE METABOLIC PANEL - Abnormal; Notable for the following components:    AST 44 (*)     All other components within normal limits   PROTIME-INR          Imaging Results          CTA Chest Non Coronary (Final result)  Result time 02/26/20 22:47:25    Final result by Alfredo Carreon MD (02/26/20 22:47:25)                 Impression:      1. No evidence of pulmonary embolism.  2. Mild cardiomegaly.  3. Small 2 mm pulmonary nodule in the left upper lobe is nonspecific.      Electronically signed by: Alfredo Carreon  Date:    02/26/2020  Time:    22:47             Narrative:    EXAMINATION:  CTA CHEST NON CORONARY    CLINICAL HISTORY:  upper extremity DVTs r/o PE;    TECHNIQUE:  Low dose axial images, sagittal and coronal reformations were obtained from the thoracic inlet to the lung bases following the IV administration of 75 mL of Omnipaque 350.  Contrast timing was optimized to evaluate the pulmonary arteries.  MIP images were performed.    COMPARISON:  None    FINDINGS:  Pulmonary arteries are adequately enhanced.  No filling defects to indicate pulmonary embolism.    The aorta is normal in caliber.    Heart is mildly enlarged.    Small 2 mm pulmonary nodule in the left upper lobe on axial 149.    For a solid nodule <6 mm, Fleischner Society 2017 guidelines recommend no  routine follow up for a low risk patient, or follow-up with non-contrast chest CT at 12 months in a high risk patient.    No evidence of any focal mass or lobar consolidation.    No acute osseous abnormality.    No effusion or pneumothorax.                                US Upper Extremity Veins Bilateral (Final result)  Result time 02/26/20 20:25:30    Final result by Ubaldo Strickland MD (02/26/20 20:25:30)                 Impression:      Bilateral upper extremity deep venous thrombosis, as above.    This report was flagged in Epic as abnormal.      Electronically signed by: Ubaldo Strickland MD  Date:    02/26/2020  Time:    20:25             Narrative:    EXAMINATION:  US UPPER EXTREMITY VEINS BILATERAL    CLINICAL HISTORY:  R arm pain/swelling;    TECHNIQUE:  Duplex and color flow Doppler evaluation and dynamic compression was performed of the upper extremity veins.    COMPARISON:  None    FINDINGS:  Right upper extremity:    Thrombosis is seen within the right subclavian, axillary, and basilic veins.  Right internal jugular, brachial, and cephalic veins are patent.    Left upper extremity:    Nonocclusive thrombus is seen within the left internal jugular vein.  Additional thrombosis is seen within the left axillary vein.  Left subclavian, brachial, basilic, and cephalic veins are patent.                                 Medical Decision Making:   History:   Old Medical Records: I decided to obtain old medical records.  Old Records Summarized: other records.  Initial Assessment:   59-year-old female with significant past medical history including stroke, gastroparesis, cardiac arrest in 2001 s/p pacemaker placement with subsequent lead infection requiring removal and IV ABX via LUE PICC, presenting to ER after outpatient ultrasound revealed DVT to the right upper extremity.  Patient denies chest pain, shortness of breath, leg pain or swelling.  Vitals unremarkable.  Will obtain basic labs, repeat ultrasound, and  anticipate admission for initiation of anticoagulation given multiple comorbidities including prior stroke.  Differential Diagnosis:   Differential Diagnosis includes, but is not limited to:  Fracture, dislocation, compartment syndrome, nerve injury/palsy, vascular injury, DVT, rhabdomyolysis, hemarthrosis, septic joint, bursitis, muscle strain, ligament tear/sprain, laceration with foreign body, abrasion, soft tissue contusion, osteoarthritis.    Clinical Tests:   Lab Tests: Reviewed and Ordered  Radiological Study: Ordered and Reviewed  ED Management:  Labs unremarkable.  Ultrasound reveals bilateral upper extremity DVTs, extending to axillary, basilic, and subclavian veins on the right and axillary and internal jugular vein on the left.  [facility] contacted for admission for initiation of anticoagulation and further management.    On re-evaluation, the patient's status has remained stable.  At this time, I believe the patient should be admitted to the hospital for further evaluation and management of BUE DVT.  [facility] service was contacted and the case was discussed.   The consulting physician/team agrees with plan and will admit under their service.   The patient and family were updated with test results, overall impression, and further plan of care. All questions were answered. The patient expressed understanding and agrees with the current plan.                                   Clinical Impression:       ICD-10-CM ICD-9-CM   1. Acute bilateral deep vein thrombosis (DVT) of upper extremities I82.623 453.82         Disposition:   Disposition: Admitted  Condition: Stable     ED Disposition Condition    Admit                           Fran Patterson MD  02/27/20 0126

## 2020-02-27 NOTE — H&P
[facility] Medicine H&P Note     Admitting Team: [facility] Hospitalist Team A  Attending Physician: Catherine Rodriguez MD  Resident: Aris  Intern: MAGY Rodriguez     Date of Admit: 2/26/2020    Chief Complaint     Right upper extremity pain/swelling x 5 days     Subjective:      History of Present Illness:  Nishi Pérez is a 59 y.o. female with sarcoidosis, recent AICD pocket infection with bacteremia, hypothyrodism, GERD, history of CVA x2 who presented to [facility] on 2/26/2020 with a primary complaint of right upper extremity pain and swelling for 5 days.     The patient was in their usual state of health (lives in NH but ambulatory and mostly independent of ADLs) until 12/31 when she was admitted to [facility] for an AICD pocket abscess. She was placed on antibiotics and eventually transferred to [facility] for vascular surgery to remove the AICD and leads. She was discharged on 1/17 back to her NH to complete 2 weeks of IV antibiotics via a left upper extremity midline. She states that she had been doing ok with new symptoms until about 5 days prior to admission when she started with pain and swelling of her RUE. She was seen by the NH MD on 2/24 and an ultrasound was obtained. This resulted today with a DVT causing her to be sent to the ED.     She denies LE edema, LUE edema, chest pain, shortness of breath, other acute complaint.     In the ED, b/l UE ultrasounds were completed and were significant for b/l DVTs. She was given 1 mg/kg Lovenox and medicine consulted for admission.     Past Medical History:  Past Medical History:   Diagnosis Date    Aicd lead infection     Aortic coarctation     Bicuspid aortic valve     Bradycardia     Cardiac arrest 2001    Migraine headache     Neuromuscular dysfunction of bladder     Sarcoidosis     Stroke        Past Surgical History:  No past surgical history on file.    Allergies:  Review of patient's allergies indicates:   Allergen Reactions     Butorphanol tartrate Other (See Comments)    Erythromycin base     Gabapentin Other (See Comments)    Carbamazepine Palpitations    Epinephrine Palpitations    Erythromycin Nausea Only    Statins-hmg-coa reductase inhibitors Rash       Home Medications:  Prior to Admission medications    Medication Sig Start Date End Date Taking? Authorizing Provider   azelastine (ASTELIN) 137 mcg (0.1 %) nasal spray 1 spray 2 (two) times daily.   Yes Historical Provider, MD   bisacodyL (DULCOLAX) 5 mg EC tablet Take 5 mg by mouth daily as needed.   Yes Historical Provider, MD   cetirizine (ZYRTEC) 10 MG tablet Take 10 mg by mouth every evening.   Yes Historical Provider, MD   clonazePAM (KLONOPIN) 1 MG tablet Take 1 mg by mouth 2 (two) times daily as needed.   Yes Historical Provider, MD   clopidogreL (PLAVIX) 75 mg tablet Take 75 mg by mouth once daily.   Yes Historical Provider, MD   clotrimazole (MYCELEX) 10 mg antelmo Take 10 mg by mouth 2 (two) times daily.   Yes Historical Provider, MD   cyanocobalamin (VITAMIN B-12) 1000 MCG tablet 1,000 mcg every 30 days. 1ml IM monthly   Yes Historical Provider, MD   cycloSPORINE (RESTASIS) 0.05 % ophthalmic emulsion 1 drop 2 (two) times daily.   Yes Historical Provider, MD   ezetimibe (ZETIA) 10 mg tablet Take 10 mg by mouth every evening.   Yes Historical Provider, MD   fluticasone propionate (FLONASE) 50 mcg/actuation nasal spray 1 spray once daily.   Yes Historical Provider, MD   furosemide (LASIX) 80 MG tablet Take 80 mg by mouth once daily.   Yes Historical Provider, MD   hydroxychloroquine (PLAQUENIL) 200 mg tablet Take 200 mg by mouth once daily.   Yes Historical Provider, MD   hydrOXYzine HCl (ATARAX) 25 MG tablet Take 25 mg by mouth every 6 (six) hours as needed.   Yes Historical Provider, MD   levothyroxine (SYNTHROID) 137 MCG Tab tablet Take 137 mcg by mouth once daily.   Yes Historical Provider, MD   magnesium oxide (MAG-OX) 400 mg (241.3 mg magnesium) tablet Take 400 mg  by mouth 2 (two) times daily.   Yes Historical Provider, MD   metOLazone (ZAROXOLYN) 2.5 MG tablet Take 2.5 mg by mouth. Give on Tuesday and Saturday only   Yes Historical Provider, MD   omeprazole (PRILOSEC) 20 MG capsule Take 20 mg by mouth once daily.   Yes Historical Provider, MD   oxyCODONE-acetaminophen (PERCOCET) 5-325 mg per tablet Take 1 tablet by mouth every 4 (four) hours as needed. 1/18/20  Yes Historical Provider, MD   polyethylene glycol (GLYCOLAX) 17 gram/dose powder Take 17 g by mouth once daily.   Yes Historical Provider, MD   potassium chloride (KLOR-CON) 20 mEq Pack Take 40 mEq by mouth. TID on Tuesday and Saturday   Yes Historical Provider, MD   potassium chloride SA (K-DUR,KLOR-CON) 20 MEQ tablet Take 2 tablets by mouth. Every Monday, Wednesday, Friday, Sunday   Yes Historical Provider, MD   promethazine (PHENERGAN) 25 MG tablet Take 25 mg by mouth every 6 (six) hours as needed.   Yes Historical Provider, MD   ranitidine (ZANTAC) 300 MG tablet 300 mg every evening.   Yes Historical Provider, MD   riboflavin, vitamin B2, 400 mg Tab Take 400 mg by mouth. 5/1/19 4/30/20 Yes Historical Provider, MD   sertraline (ZOLOFT) 50 MG tablet Take 50 mg by mouth once daily.   Yes Historical Provider, MD   tamsulosin (FLOMAX) 0.4 mg Cap 2 capsules once daily.   Yes Historical Provider, MD   tiZANidine 2 mg Cap Take 4 mg by mouth 2 (two) times daily.   Yes Historical Provider, MD   traMADol (ULTRAM) 50 mg tablet Take 100 mg by mouth once daily.   Yes Historical Provider, MD   VITAMIN B COMPLEX ORAL Take 1 tablet by mouth once daily.   Yes Historical Provider, MD       Family History:  No family history on file.    Social History:  Social History     Tobacco Use    Smoking status: Not on file   Substance Use Topics    Alcohol use: Not on file    Drug use: Not on file       Review of Systems:  Pertinent items are noted in HPI. All other systems are reviewed and are negative.    Health Maintaince :   Primary  Care Physician: Dr. Smith ([facility])    Immunizations:   TDap UTD    Flu UTD  Pna UTD    Cancer Screening:  PAP: not UTD  MMG: not UTD  Colonoscopy: not UTD     Objective:   Last 24 Hour Vital Signs:  BP  Min: 100/72  Max: 173/74  Temp  Av.9 °F (36.6 °C)  Min: 97.8 °F (36.6 °C)  Max: 97.9 °F (36.6 °C)  Pulse  Av.8  Min: 50  Max: 63  Resp  Av  Min: 14  Max: 25  SpO2  Av.5 %  Min: 98 %  Max: 100 %  Height  Av' (152.4 cm)  Min: 5' (152.4 cm)  Max: 5' (152.4 cm)  Weight  Av.3 kg (144 lb)  Min: 65.3 kg (144 lb)  Max: 65.3 kg (144 lb)  Body mass index is 28.12 kg/m².  No intake/output data recorded.    Physical Examination:  Gen: awake, alert, NAD  Head: NC/AT  Eyes: conjunctiva clear, EOMI, PERRL  Throat: MMM, OP clear  Lungs: CTAB, normal respiratory effort   Cardiac: RRR, no murmurs   Abdominal: soft, ND/NT, +BS  Extremities: acyanotic, mild edema RUE>LUE, TTP over dressed chest wound and right arm, LE edema 1+ b/l  Pulses: 2+ and symmetric   Skin: no rashes or lesions noted   Neuro: AAOx4, no focal deficit     Laboratory:  Most Recent Data:  CBC:   Lab Results   Component Value Date    WBC 6.00 2020    HGB 11.8 (L) 2020    HCT 36.4 (L) 2020     2020    MCV 94 2020    RDW 12.5 2020     BMP:   Lab Results   Component Value Date     2020    K 3.6 2020    CL 98 2020    CO2 28 2020    BUN 18 2020    CREATININE 0.9 2020    GLU 93 2020    CALCIUM 9.6 2020     LFTs:   Lab Results   Component Value Date    PROT 7.7 2020    ALBUMIN 4.2 2020    BILITOT 0.2 2020    AST 44 (H) 2020    ALKPHOS 95 2020    ALT 36 2020     Coags:   Lab Results   Component Value Date    INR 0.9 2020     Trended Lab Data:  Recent Labs   Lab 20  1848   WBC 6.00   HGB 11.8*   HCT 36.4*      MCV 94   RDW 12.5      K 3.6   CL 98   CO2 28   BUN 18   CREATININE  0.9   GLU 93   PROT 7.7   ALBUMIN 4.2   BILITOT 0.2   AST 44*   ALKPHOS 95   ALT 36       Other Results:  Radiology:  Imaging Results           US Upper Extremity Veins Bilateral (Final result)  Result time 02/26/20 20:25:30    Final result by Ubaldo Strickland MD (02/26/20 20:25:30)                 Impression:      Bilateral upper extremity deep venous thrombosis, as above.    This report was flagged in Epic as abnormal.      Electronically signed by: Ubaldo Strickland MD  Date:    02/26/2020  Time:    20:25             Narrative:    EXAMINATION:  US UPPER EXTREMITY VEINS BILATERAL    CLINICAL HISTORY:  R arm pain/swelling;    TECHNIQUE:  Duplex and color flow Doppler evaluation and dynamic compression was performed of the upper extremity veins.    COMPARISON:  None    FINDINGS:  Right upper extremity:    Thrombosis is seen within the right subclavian, axillary, and basilic veins.  Right internal jugular, brachial, and cephalic veins are patent.    Left upper extremity:    Nonocclusive thrombus is seen within the left internal jugular vein.  Additional thrombosis is seen within the left axillary vein.  Left subclavian, brachial, basilic, and cephalic veins are patent.                                 Assessment:     Nishi Pérez is a 59 y.o. female with sarcoidosis, hypothyroidism, GERD, history of CVA who presents with 5 days RUL pain and swelling found to have b/l UE DVTs.      Plan:     Extensive bilateral UE DVTs  - Pt with 5 days right arm pain and swelling, U/S done 2/24 at nursing home significant for DVT so sent to the ED today  - On our ultrasound pt with extensive DVTs of both upper extremities. Left IJ and axillary veins, right subclavian, axillary, and basalic veins   - Admitted 1/2020 for infected AICD pocket (right sided) and sent home on IV abx with midline (left sided) likely provoking factor   - Given 1 mg/kg Lovenox in the ED, will continue BID  - Pt previously on heparin? To prevent CVA after 2  cryptogenic strokes. May need to consider Lovenox for treatment over DOAC. Will need minimum 3 months treatment   - Hypercoagulability work up completed in 2019 significant for elevated VWF and Factor VIII  - Will obtain CTA chest to exclude PE     Recent admission for infection of AICD leads s/p removal of leads and 2 weeks IV abx   - Pt admitted to [facility] from 12/31 to 1/17 for infected AICD pocket and leads  - Polymicrobial bacteremia treated with 2 weeks Flagyl and Rocephin  - Continues with open wound from surgery, will consult wound care  - AICD placed for cardiac arrest in 2001    Sarcoidosis   - Per pt with controlled disease at this time    - Previously on O2, history of neurogenic bladder, gastroparesis with bowel obstruction/perforation   - Takes lasix 80 daily, bethanechol 10 mg QID and flomax 0.4 daily for bladder, Miralax daily, PRN dulcolax for constipation, Zantac 300, Omeprazole 20 for GERD. Continue while inpatient   - Continue home Plaquenil   - Follows with Dr. Gomez    Depression/Anxiety   - Continue home Zoloft, Klonopin   - No acute issues     Allergic Rhinitis   - Continue home Zyrtec/Flonase/Azelastine   - No acute issues     Hypothyroidism   - TSH checked 12/31 1.995  - Continue home Synthroid 137 mcg    Migraine   - Pt on Riboflavin at home, infrequent migraines   - Vitamin not on formulary will resume at discharge     History of CVA  - Pt with reported CVAs when she was 18 and 27 yo  - Follows with Dr. Crum at [facility]  - Continue Zetia (pt can't tolerate statin) and Plavix   - Previously worked up for hypercoagulability, essentially WNL. Did have elevated Factor VIII and von Willebrand     Normocytic Anemia   - Hgb 11.8 on admission labs with MCV 94, baseline 11-12  - Anemia w/u 10/2019 consistent with AoCI  - Monitor     Dispo: admit for initiation of anticoagulation for b/l UE DVTs    Code Status:     Full    Joel Hurst MD  [facility] Internal Medicine/Pediatrics  HO-IV    [facility] Medicine Hospitalist Pager numbers:   [facility] Hospitalist Medicine Team A (Alin/Michael): [phone]  [facility] Hospitalist Medicine Team B (Mac/Terri):  [phone]

## 2020-02-27 NOTE — CONSULTS
Wound care nurse consult for right chest wall wound. assessment below.    Notified Dr. MAGY Rodriguez/Alin team of assessment, orders given. Wound care performed as directed, cleaned with 1/4 Dakin solution, applied Aquacel AG, covered with foam dressing.    Recommend that pt go to [facility] for wound for management and  Hypergranulation. Consider also Dr. Tee using Siver Nitrate stick while pt is here to bring down granulation, otherwise can be done in clinic.     02/27/20 1100        Wound 02/27/20 0930 Non pressure chronic ulcer upper Chest   Date First Assessed/Time First Assessed: 02/27/20 0930   Pre-existing: Yes  Primary Wound Type: Non pressure chronic ulcer  Side: Right  Orientation: upper  Location: Chest   Wound WDL ex   Dressing Appearance Intact   Drainage Amount Small   Drainage Characteristics/Odor No odor;Creamy;Green   Appearance Red;Moist  (hypergranulated)   Tissue loss description Partial thickness   Red (%), Wound Tissue Color 100 %   Periwound Area Pink;Intact   Wound Edges Open;Defined   Wound Length (cm) 1 cm   Wound Width (cm) 2 cm   Wound Depth (cm) 0.5 cm   Wound Volume (cm^3) 1 cm^3   Wound Surface Area (cm^2) 2 cm^2   Care Cleansed with:;Other (see comments)  (Dakins)   Dressing Hydrofiber   Dressing Change Due 02/28/20

## 2020-02-27 NOTE — PLAN OF CARE
Discharge rounds on patient. Discussed followup appointments, blue discharge folder, discharge nurse will go over home medications and reasons for medications and final discharge instructions. All patient/caregiver questions answered. Patient verbalized understanding.      Transportation has been arranged for 4pm. Patient is aware. Beside nurse to call and give report to nurse Thai Mccoy .       02/27/20 1653   Final Note   Assessment Type Discharge Planning Assessment   Anticipated Discharge Disposition alf Nu   What phone number can be called within the next 1-3 days to see how you are doing after discharge? [phone]   Hospital Follow Up  Appt(s) scheduled? Yes   Discharge plans and expectations educations in teach back method with documentation complete? Yes   Right Care Referral Info   Post Acute Recommendation Home-care   Referral Type NH   Facility Name [place], [place]       Future Appointments   Date Time Provider Department Center   3/4/2020  4:00 PM [place] MD [facility] HEM ONC David [facility]pe

## 2020-02-27 NOTE — DISCHARGE SUMMARY
[facility] Hospital Medicine Discharge Summary    Primary Team: [facility] Hospitalist Team A  Attending Physician: Catherine Rodriguez MD  Resident: Aris  Intern: MAGY Rodriguez     Date of Admit: 2/26/2020  Date of Discharge: 2/27/2020    Discharge to: [facility]   Condition: Stable     Discharge Diagnoses     Patient Active Problem List   Diagnosis    Acute bilateral deep vein thrombosis (DVT) of upper extremities    Sarcoidosis    History of stroke    Hypothyroid    HLD (hyperlipidemia)    GERD (gastroesophageal reflux disease)    Open wound of right chest wall       Consultants and Procedures     Consultants:  Cardiology     Procedures:   None     Imaging:  CTA Chest  1. No evidence of pulmonary embolism.  2. Mild cardiomegaly.  3. Small 2 mm pulmonary nodule in the left upper lobe is nonspecific.    Upper extremity ultrasound  Right upper extremity:   Thrombosis is seen within the right subclavian, axillary, and basilic veins.  Right internal jugular, brachial, and cephalic veins are patent.    Left upper extremity:    Nonocclusive thrombus is seen within the left internal jugular vein.  Additional thrombosis is seen within the left axillary vein.  Left subclavian, brachial, basilic, and cephalic veins are patent.    Brief History of Present Illness      Nishi Pérez is a 59 y.o. female with sarcoidosis, recent AICD pocket infection with bacteremia, hypothyrodism, GERD, history of CVA x2 who presented to [facility] on 2/26/2020 with a primary complaint of right upper extremity pain and swelling for 5 days.      The patient was in her usual state of health (lives in NH but ambulatory and mostly independent of ADLs) until 12/31 when she was admitted to [facility] for an AICD pocket abscess. She was placed on antibiotics and eventually transferred to [facility] for vascular surgery to remove the AICD and leads. She was discharged on 1/17 back to her NH to complete 2 weeks of IV antibiotics via a left  upper extremity midline. She states that she had been doing ok with new symptoms until about 5 days prior to admission when she started with pain and swelling of her RUE. She was seen by the NH MD on 2/24 and an ultrasound was obtained. This resulted today with a DVT causing her to be sent to the ED.      She denies LE edema, LUE edema, chest pain, shortness of breath, other acute complaint.     For the full HPI please refer to the History & Physical from this admission.    Hospital Course By Problem with Pertinent Findings     Extensive bilateral UE DVTs  - Pt with 5 days right arm pain and swelling, U/S done 2/24 at nursing home significant for DVT so sent to the ED   - On our ultrasound pt with extensive DVTs of both upper extremities. Left IJ and axillary veins, right subclavian, axillary, and basalic veins   - Admitted 1/2020 for infected AICD pocket (right sided) and sent home on IV abx with midline (left sided) likely provoking factor   - Given 1 mg/kg Lovenox in the ED, will continue BID at discharge   - Pt previously on heparin? To prevent CVA after 2 cryptogenic strokes. Sent on Lovenox for treatment over DOAC. Will need minimum 3 months treatment   - Hypercoagulability work up completed in 2019 significant for elevated VWF and Factor VIII  - CTA chest without PE   - Vascular cardiology did not feel she was a candidate for thrombectomy      Recent admission for infection of AICD leads s/p removal of leads and 2 weeks IV abx   - Pt admitted to [facility] from 12/31 to 1/17 for infected AICD pocket and leads  - Polymicrobial bacteremia treated with 2 weeks Flagyl and Rocephin  - Continues with open wound from surgery, consulted wound care. Wound tended to while admitted   - AICD placed for cardiac arrest in 2001     Sarcoidosis   - Per pt with controlled disease at this time    - Previously on O2, history of neurogenic bladder, gastroparesis with bowel obstruction/perforation   - Takes lasix 80  daily, bethanechol 10 mg QID and flomax 0.4 daily for bladder, Miralax daily, PRN dulcolax for constipation, Zantac 300, Omeprazole 20 for GERD. Continue while inpatient   - Continue home Plaquenil   - Follows with Dr. Gomez     Depression/Anxiety   - Continue home Zoloft, Klonopin   - No acute issues      Allergic Rhinitis   - Continue home Zyrtec/Flonase/Azelastine   - No acute issues      Hypothyroidism   - TSH checked 12/31 1.995  - Continued home Synthroid 137 mcg     Migraine   - Pt on Riboflavin at home, infrequent migraines   - Vitamin not on formulary will resumed discharge      History of CVA  - Pt with reported CVAs when she was 18 and 29 yo  - Follows with Dr. Crum at [facility]  - Continue Zetia (pt can't tolerate statin) and Plavix   - Previously worked up for hypercoagulability, essentially WNL. Did have elevated Factor VIII and von Willebrand factor      Normocytic Anemia   - Hgb 11.8 on admission labs with MCV 94, baseline 11-12  - Anemia w/u 10/2019 consistent with AoCI    Discharge Medications        Medication List      START taking these medications    enoxaparin 100 mg/mL Syrg  Commonly known as:  LOVENOX  Inject 0.7 mLs (70 mg total) into the skin every 12 (twelve) hours.        CHANGE how you take these medications    potassium chloride SA 20 MEQ tablet  Commonly known as:  K-DURALEXANDRAOR-CON  Take 1 tablet (20 mEq total) by mouth 2 (two) times daily.  What changed:    · how much to take  · when to take this  · additional instructions  · Another medication with the same name was removed. Continue taking this medication, and follow the directions you see here.        CONTINUE taking these medications    azelastine 137 mcg (0.1 %) nasal spray  Commonly known as:  ASTELIN     bisacodyL 5 mg EC tablet  Commonly known as:  DULCOLAX     cetirizine 10 MG tablet  Commonly known as:  ZYRTEC     clonazePAM 1 MG tablet  Commonly known as:  KLONOPIN     clopidogreL 75 mg tablet  Commonly known as:   PLAVIX     clotrimazole 10 mg antelmo  Commonly known as:  MYCELEX     cyanocobalamin 1000 MCG tablet  Commonly known as:  VITAMIN B-12     ezetimibe 10 mg tablet  Commonly known as:  ZETIA     fluticasone propionate 50 mcg/actuation nasal spray  Commonly known as:  FLONASE     furosemide 80 MG tablet  Commonly known as:  LASIX     hydroxychloroquine 200 mg tablet  Commonly known as:  PLAQUENIL     levothyroxine 137 MCG Tab tablet  Commonly known as:  SYNTHROID     magnesium oxide 400 mg (241.3 mg magnesium) tablet  Commonly known as:  MAG-OX     omeprazole 20 MG capsule  Commonly known as:  PRILOSEC     oxyCODONE-acetaminophen 5-325 mg per tablet  Commonly known as:  PERCOCET     polyethylene glycol 17 gram/dose powder  Commonly known as:  GLYCOLAX     promethazine 25 MG tablet  Commonly known as:  PHENERGAN     ranitidine 300 MG tablet  Commonly known as:  ZANTAC     Restasis 0.05 % ophthalmic emulsion  Generic drug:  cycloSPORINE     riboflavin (vitamin B2) 400 mg Tab     sertraline 50 MG tablet  Commonly known as:  ZOLOFT     tamsulosin 0.4 mg Cap  Commonly known as:  FLOMAX     tiZANidine 2 mg Cap     traMADol 50 mg tablet  Commonly known as:  ULTRAM     VITAMIN B COMPLEX ORAL        STOP taking these medications    hydrOXYzine HCl 25 MG tablet  Commonly known as:  ATARAX     metOLazone 2.5 MG tablet  Commonly known as:  ZAROXOLYN           Where to Get Your Medications      Information about where to get these medications is not yet available    Ask your nurse or doctor about these medications  · enoxaparin 100 mg/mL Syrg  · potassium chloride SA 20 MEQ tablet           Discharge Information:   Diet:  Cardiac      Physical Activity:  As tolerated              Instructions:  1. Take all medications as prescribed  2. Keep all follow-up appointments  3. Return to the hospital or call your primary care physicians if any worsening symptoms such as fever, chest pain, shortness of breath, return of symptoms, or any  other concerns.    Follow-Up Appointments:  PCP 1 week  Hematology     Joel Hurst MD  [facility] Internal Medicine/Pediatrics, -

## 2020-02-27 NOTE — PLAN OF CARE
NH referral sent to [facility].         02/27/20 1219   Post-Acute Status   Post-Acute Authorization Placement   Post-Acute Placement Status Referrals Sent

## 2020-02-27 NOTE — ED NOTES
Pt presents to the ED w/ c/ of RUE swelling and pain. Pt reports that she had recent surgery to remove her defibrillator leads. Pt reports on Monday she had an ultrasound for the RUE and was found to have a DVT in RUE. Noted swelling to RUE. Pt reports pain on movement of the RUE. Pt denies chest pain or SOB. Pt is connected to cardiac monitor, BP cuff, and pulse ox.

## 2020-02-27 NOTE — CONSULTS
[facility]  Cardiology  Consult Note    Patient Name: Nishi Pérez  MRN: [id number]  Admission Date: 2/26/2020  Hospital Length of Stay: 1 days  Code Status: Full Code   Attending Provider: Catherine Rodriguez MD   Consulting Provider: Osiel Menchaca NP  Primary Care Physician: Triston Smith MD  Principal Problem:Acute bilateral deep vein thrombosis (DVT) of upper extremities    Patient information was obtained from patient, past medical records and ER records.     Inpatient consult to [facility]  Consult performed by: Osiel Menchaca NP  Consult ordered by: Joel Hurst MD        Subjective:     Chief Complaint:  Arm swelling     HPI:   Nishi Pérez is a 59 y.o. female with sarcoidosis, recent AICD pocket infection with bacteremia, hypothyrodism, GERD, history of CVA x2, hypercoagulable state who presented to the ED on 2/26/2020 with a primary complaint of right upper extremity pain and swelling for 5 days. 12/31  she was admitted to [facility] for an AICD pocket abscess after sticking herself with a safety pin. She was placed on antibiotics and eventually transferred to [facility] for vascular surgery to remove the AICD and leads. She was discharged on 1/17 back to her NH to complete 2 weeks of IV antibiotics via a left upper extremity midline. Patient's LUE midline began to leak so she presented back to the ED. Her line was DC'd and a new line was placed to her right arm. Patient completed her abx and the line was discontinued. Patient reports right shoulder pain since her device was removed. She denies any arm pain. Patient reports that another resident at the NH noted her arm to be swollen and patient alerted staff which resulted in an US. Patient was then transported to the ED for further evaluation. Patient reports taking Heparin for many years for her clotting disorder and was switched to Coumadin at some point. She reports difficulty regulating her INR so she was  placed back on Heparin and eventually transitioned to Plavix. She denies chest pain, shortness of breath, other acute complaint.    In the ED, b/l UE ultrasounds were completed and were significant for b/l DVTs.   She is currently on Lovenox.     Past Medical History:   Diagnosis Date    Aicd lead infection     Aortic coarctation     Bicuspid aortic valve     Bradycardia     Cardiac arrest 2001    Migraine headache     Neuromuscular dysfunction of bladder     Sarcoidosis     Stroke        History reviewed. No pertinent surgical history.    Review of patient's allergies indicates:   Allergen Reactions    Butorphanol tartrate Other (See Comments)    Erythromycin base     Gabapentin Other (See Comments)    Carbamazepine Palpitations    Epinephrine Palpitations    Erythromycin Nausea Only    Statins-hmg-coa reductase inhibitors Rash       No current facility-administered medications on file prior to encounter.      Current Outpatient Medications on File Prior to Encounter   Medication Sig    azelastine (ASTELIN) 137 mcg (0.1 %) nasal spray 1 spray 2 (two) times daily.    bisacodyL (DULCOLAX) 5 mg EC tablet Take 5 mg by mouth daily as needed.    cetirizine (ZYRTEC) 10 MG tablet Take 10 mg by mouth every evening.    clonazePAM (KLONOPIN) 1 MG tablet Take 1 mg by mouth 2 (two) times daily as needed.    clopidogreL (PLAVIX) 75 mg tablet Take 75 mg by mouth once daily.    clotrimazole (MYCELEX) 10 mg antelmo Take 10 mg by mouth 2 (two) times daily.    cyanocobalamin (VITAMIN B-12) 1000 MCG tablet 1,000 mcg every 30 days. 1ml IM monthly    cycloSPORINE (RESTASIS) 0.05 % ophthalmic emulsion 1 drop 2 (two) times daily.    ezetimibe (ZETIA) 10 mg tablet Take 10 mg by mouth every evening.    fluticasone propionate (FLONASE) 50 mcg/actuation nasal spray 1 spray once daily.    furosemide (LASIX) 80 MG tablet Take 80 mg by mouth once daily.    hydroxychloroquine (PLAQUENIL) 200 mg tablet Take 200 mg by  mouth once daily.    hydrOXYzine HCl (ATARAX) 25 MG tablet Take 25 mg by mouth every 6 (six) hours as needed.    levothyroxine (SYNTHROID) 137 MCG Tab tablet Take 137 mcg by mouth once daily.    magnesium oxide (MAG-OX) 400 mg (241.3 mg magnesium) tablet Take 400 mg by mouth 2 (two) times daily.    metOLazone (ZAROXOLYN) 2.5 MG tablet Take 2.5 mg by mouth. Give on Tuesday and Saturday only    omeprazole (PRILOSEC) 20 MG capsule Take 40 mg by mouth once daily.     oxyCODONE-acetaminophen (PERCOCET) 5-325 mg per tablet Take 1 tablet by mouth every 4 (four) hours as needed.    polyethylene glycol (GLYCOLAX) 17 gram/dose powder Take 17 g by mouth once daily.    potassium chloride (KLOR-CON) 20 mEq Pack Take 40 mEq by mouth. TID on Tuesday and Saturday    potassium chloride SA (K-DUR,KLOR-CON) 20 MEQ tablet Take 2 tablets by mouth. Every Monday, Wednesday, Friday, Sunday    promethazine (PHENERGAN) 25 MG tablet Take 25 mg by mouth every 6 (six) hours as needed.    ranitidine (ZANTAC) 300 MG tablet 300 mg every evening.    riboflavin, vitamin B2, 400 mg Tab Take 400 mg by mouth.    sertraline (ZOLOFT) 50 MG tablet Take 50 mg by mouth once daily.    tamsulosin (FLOMAX) 0.4 mg Cap 2 capsules once daily.    tiZANidine 2 mg Cap Take 4 mg by mouth 2 (two) times daily.    traMADol (ULTRAM) 50 mg tablet Take 100 mg by mouth once daily.    VITAMIN B COMPLEX ORAL Take 1 tablet by mouth once daily.     Family History     Problem Relation (Age of Onset)    Cancer Mother    Heart disease Father        Tobacco Use    Smoking status: Never Smoker    Smokeless tobacco: Never Used   Substance and Sexual Activity    Alcohol use: Never     Frequency: Never    Drug use: Never    Sexual activity: Not on file     Review of Systems   Constitution: Negative. Negative for diaphoresis.   HENT: Negative.    Eyes: Negative.    Cardiovascular: Negative for chest pain, irregular heartbeat, leg swelling, near-syncope, orthopnea,  palpitations, paroxysmal nocturnal dyspnea and syncope.   Respiratory: Negative for cough and shortness of breath.    Endocrine: Negative.    Hematologic/Lymphatic: Negative for bleeding problem. Does not bruise/bleed easily.   Skin: Negative for color change.   Musculoskeletal: Positive for arthritis and myalgias.   Gastrointestinal: Negative.  Negative for bloating, hematemesis, hematochezia, nausea and vomiting.   Genitourinary: Negative.    Neurological: Negative.    Psychiatric/Behavioral: Negative.    Allergic/Immunologic: Negative.      Objective:     Vital Signs (Most Recent):  Temp: 97 °F (36.1 °C) (02/27/20 0821)  Pulse: (!) 52 (02/27/20 1000)  Resp: 18 (02/27/20 0821)  BP: (!) 140/82 (02/27/20 1000)  SpO2: 96 % (02/26/20 2202) Vital Signs (24h Range):  Temp:  [97 °F (36.1 °C)-98.3 °F (36.8 °C)] 97 °F (36.1 °C)  Pulse:  [50-63] 52  Resp:  [14-25] 18  SpO2:  [96 %-100 %] 96 %  BP: (100-173)/(56-82) 140/82     Weight: 65 kg (143 lb 4.8 oz)  Body mass index is 27.99 kg/m².    SpO2: 96 %  O2 Device (Oxygen Therapy): room air      Intake/Output Summary (Last 24 hours) at 2/27/2020 1107  Last data filed at 2/27/2020 0543  Gross per 24 hour   Intake 205 ml   Output 400 ml   Net -195 ml       Lines/Drains/Airways     Peripheral Intravenous Line                 Peripheral IV - Single Lumen 02/26/20 1855 20 G Left Forearm less than 1 day                Physical Exam   Constitutional: She is oriented to person, place, and time. She appears well-developed and well-nourished. No distress.   HENT:   Head: Atraumatic.   Eyes: Right eye exhibits no discharge. Left eye exhibits no discharge.   Neck: No JVD present.   Cardiovascular: Normal rate and regular rhythm. Exam reveals no gallop and no friction rub.   Murmur heard.  Pulmonary/Chest: Effort normal and breath sounds normal.   Abdominal: Soft. Bowel sounds are normal.   Musculoskeletal: She exhibits edema (mild posterior RUE).   Neurological: She is alert and oriented  to person, place, and time.   Skin: Skin is warm and dry. She is not diaphoretic.   Right chest wall wound    Psychiatric: She has a normal mood and affect. Her behavior is normal.       Significant Labs:   BMP:   Recent Labs   Lab 02/26/20 1848 02/27/20  0423   GLU 93 93    139   K 3.6 3.4*   CL 98 102   CO2 28 29   BUN 18 13   CREATININE 0.9 0.8   CALCIUM 9.6 9.2   MG  --  2.4   , CMP   Recent Labs   Lab 02/26/20 1848 02/27/20  0423    139   K 3.6 3.4*   CL 98 102   CO2 28 29   GLU 93 93   BUN 18 13   CREATININE 0.9 0.8   CALCIUM 9.6 9.2   PROT 7.7 6.6   ALBUMIN 4.2 3.6   BILITOT 0.2 0.3   ALKPHOS 95 79   AST 44* 35   ALT 36 30   ANIONGAP 13 8   ESTGFRAFRICA >60 >60   EGFRNONAA >60 >60   , CBC   Recent Labs   Lab 02/26/20 1848 02/27/20  0422   WBC 6.00 4.92   HGB 11.8* 11.4*   HCT 36.4* 34.2*    292   , INR   Recent Labs   Lab 02/26/20 1848   INR 0.9   , Lipid Panel No results for input(s): CHOL, HDL, LDLCALC, TRIG, CHOLHDL in the last 48 hours., Troponin No results for input(s): TROPONINI in the last 48 hours. and All pertinent lab results from the last 24 hours have been reviewed.    Significant Imaging: Echocardiogram: 2D echo with color flow doppler: No results found for this or any previous visit. and Transthoracic echo (TTE) complete (Cupid Only): No results found for this or any previous visit.    Assessment and Plan:     * Acute bilateral deep vein thrombosis (DVT) of upper extremities  Patient with hypercoagulable state followed by hematology in the past  DVT to BUE in setting of Left then Right midlines for abx administration- feel her clots are subacute given timing of lines  Patient mostly asymptomatic and swelling was noted incidentally by another NH resident. Patient has had pain to her right shoulder since her ICD was removed and she has a persistent right chest wall wound  No indication for catheter directed thrombolysis or clot retrieval   INR difficult to regulate with  coumadin in the past  Heparin not the most ideal choice as outpatient  Recommend initiation of DOAC- Eliquis vs Xarelto   Refer back to hematology as an outpatient to determine duration of treatment, feel she will likely need lifelong anticoagulation           VTE Risk Mitigation (From admission, onward)         Ordered     enoxaparin injection 70 mg  Every 12 hours (non-standard times)      02/26/20 2143     IP VTE HIGH RISK PATIENT  Once      02/26/20 2143                Thank you for your consult. I will sign off. Please contact us if you have any additional questions.    Osiel Menchaca, ILSA  Cardiology   [facility]

## 2020-02-27 NOTE — PLAN OF CARE
TN met with patient at bedside. Currently patient is a resident at [facility], where she states she has been living there for the past 19 years. Patient is independent of all ADL's.Patient's daughter [phone] helps patient as needed by doing laundry and her grocery shopping. No DME noted. Per patient, she works with therapy at [facility]. Upon discharge, patient states that she will return to [facility]. [facility] transports patient to and from appointments. Patient's PCP is Dr. Smith.  Patient states she has a cardiologist and neurologist but can't remember their names at this time.      TN case manger updated whiteboard with contact information. Blue discharge folder and discharge brochure given to patient. TN has offered to contact patient daughter, patient declined. TN instructed patient to contact TN if she has any further questions or concerns. TN will continue to follow.         02/27/20 1209   Discharge Assessment   Assessment Type Discharge Planning Assessment   Confirmed/corrected address and phone number on facesheet? Yes   Assessment information obtained from? Patient;Medical Record   Expected Length of Stay (days) 1   Prior to hospitilization cognitive status: Alert/Oriented   Prior to hospitalization functional status: Independent   Current cognitive status: Alert/Oriented   Current Functional Status: Independent   Lives With facility resident  ([facility])   Able to Return to Prior Arrangements yes   Patient's perception of discharge disposition [facility] care facility   Readmission Within the Last 30 Days no previous admission in last 30 days   Patient currently being followed by outpatient case management? No   Patient currently receives any other outside agency services? No   Equipment Currently Used at Home none   Do you have any problems affording any of your prescribed medications? No   Is the patient taking medications as prescribed? yes   Does the patient  have transportation home? Yes   Transportation Anticipated agency   Does the patient receive services at the [facility]? No   Discharge Plan A Return to nursing home   Discharge Plan B Other   DME Needed Upon Discharge  none   Patient/Family in Agreement with Plan yes   Readmission Questionnaire   Have you felt down, depressed, or hopeless? 0

## 2020-02-27 NOTE — HPI
Nishi Pérez is a 59 y.o. female with sarcoidosis, recent AICD pocket infection with bacteremia, hypothyrodism, GERD, history of CVA x2, hypercoagulable state who presented to the ED on 2/26/2020 with a primary complaint of right upper extremity pain and swelling for 5 days. 12/31  she was admitted to [facility] for an AICD pocket abscess after sticking herself with a safety pin. She was placed on antibiotics and eventually transferred to [facility] for vascular surgery to remove the AICD and leads. She was discharged on 1/17 back to her NH to complete 2 weeks of IV antibiotics via a left upper extremity midline. Patient's LUE midline began to leak so she presented back to the ED. Her line was DC'd and a new line was placed to her right arm. Patient completed her abx and the line was discontinued. Patient reports right shoulder pain since her device was removed. She denies any arm pain. Patient reports that another resident at the NH noted her arm to be swollen and patient alerted staff which resulted in an US. Patient was then transported to the ED for further evaluation. Patient reports taking Heparin for many years for her clotting disorder and was switched to Coumadin at some point. She reports difficulty regulating her INR so she was placed back on Heparin and eventually transitioned to Plavix. She denies chest pain, shortness of breath, other acute complaint.    In the ED, b/l UE ultrasounds were completed and were significant for b/l DVTs.   She is currently on Lovenox.

## 2020-02-27 NOTE — PLAN OF CARE
[facility]    FACILITY TRANSFER ORDERS      Patient Name: Nishi Pérez  YOB: 1960    PCP: Triston Smith MD   PCP Address: [address] / [place]  PCP Phone Number: [phone]  PCP Fax: [phone]    Encounter Date: 02/27/2020    Admit to: [facility]    Vital Signs:  Routine    Diagnoses:   Active Hospital Problems    Diagnosis  POA    *Acute bilateral deep vein thrombosis (DVT) of upper extremities [I82.623]  Yes    Open wound of right chest wall [S21.101A]  Yes    Normocytic anemia [D64.9]  Yes    Sarcoidosis [D86.9]  Yes    History of stroke [Z86.73]  Not Applicable    Hypothyroid [E03.9]  Yes    HLD (hyperlipidemia) [E78.5]  Yes    GERD (gastroesophageal reflux disease) [K21.9]  Yes      Resolved Hospital Problems   No resolved problems to display.       Allergies:  Review of patient's allergies indicates:   Allergen Reactions    Butorphanol tartrate Other (See Comments)    Erythromycin base     Gabapentin Other (See Comments)    Carbamazepine Palpitations    Epinephrine Palpitations    Erythromycin Nausea Only    Statins-hmg-coa reductase inhibitors Rash       Diet: cardiac diet    Activities: Activity as tolerated    Nursing: Give lovenox injection (70 mg) every 12 hours until discontinued or changed by outpatient hematology    CONSULTS:    Physical Therapy to evaluate and treat.  and Occupational Therapy to evaluate and treat.    MISCELLANEOUS CARE:  None     WOUND CARE ORDERS  Yes: chest wound, continue wound care per NH MD/wound care nursing     Medications: Review discharge medications with patient and family and provide education.      Current Discharge Medication List      START taking these medications    Details   enoxaparin (LOVENOX) 100 mg/mL Syrg Inject 0.7 mLs (70 mg total) into the skin every 12 (twelve) hours.         CONTINUE these medications which have CHANGED    Details   potassium chloride SA  (K-DUR,KLOR-CON) 20 MEQ tablet Take 1 tablet (20 mEq total) by mouth 2 (two) times daily.         CONTINUE these medications which have NOT CHANGED    Details   azelastine (ASTELIN) 137 mcg (0.1 %) nasal spray 1 spray 2 (two) times daily.      bisacodyL (DULCOLAX) 5 mg EC tablet Take 5 mg by mouth daily as needed.      cetirizine (ZYRTEC) 10 MG tablet Take 10 mg by mouth every evening.      clonazePAM (KLONOPIN) 1 MG tablet Take 1 mg by mouth 2 (two) times daily as needed.      clopidogreL (PLAVIX) 75 mg tablet Take 75 mg by mouth once daily.      clotrimazole (MYCELEX) 10 mg antelmo Take 10 mg by mouth 2 (two) times daily.      cyanocobalamin (VITAMIN B-12) 1000 MCG tablet 1,000 mcg every 30 days. 1ml IM monthly      cycloSPORINE (RESTASIS) 0.05 % ophthalmic emulsion 1 drop 2 (two) times daily.      ezetimibe (ZETIA) 10 mg tablet Take 10 mg by mouth every evening.      fluticasone propionate (FLONASE) 50 mcg/actuation nasal spray 1 spray once daily.      furosemide (LASIX) 80 MG tablet Take 80 mg by mouth once daily.      hydroxychloroquine (PLAQUENIL) 200 mg tablet Take 200 mg by mouth once daily.      levothyroxine (SYNTHROID) 137 MCG Tab tablet Take 137 mcg by mouth once daily.      magnesium oxide (MAG-OX) 400 mg (241.3 mg magnesium) tablet Take 400 mg by mouth 2 (two) times daily.      omeprazole (PRILOSEC) 20 MG capsule Take 40 mg by mouth once daily.       oxyCODONE-acetaminophen (PERCOCET) 5-325 mg per tablet Take 1 tablet by mouth every 4 (four) hours as needed.    Comments: Quantity prescribed more than 7 day supply? Press F2 and select one:[id number]        polyethylene glycol (GLYCOLAX) 17 gram/dose powder Take 17 g by mouth once daily.      promethazine (PHENERGAN) 25 MG tablet Take 25 mg by mouth every 6 (six) hours as needed.      ranitidine (ZANTAC) 300 MG tablet 300 mg every evening.      riboflavin, vitamin B2, 400 mg Tab Take 400 mg by mouth.      sertraline (ZOLOFT) 50 MG tablet Take 50 mg by mouth  once daily.      tamsulosin (FLOMAX) 0.4 mg Cap 2 capsules once daily.      tiZANidine 2 mg Cap Take 4 mg by mouth 2 (two) times daily.      traMADol (ULTRAM) 50 mg tablet Take 100 mg by mouth once daily.    Comments: Quantity prescribed more than 7 day supply? Press F2 and select one:[id number]        VITAMIN B COMPLEX ORAL Take 1 tablet by mouth once daily.         STOP taking these medications       hydrOXYzine HCl (ATARAX) 25 MG tablet Comments:   Reason for Stopping:         metOLazone (ZAROXOLYN) 2.5 MG tablet Comments:   Reason for Stopping:         potassium chloride (KLOR-CON) 20 mEq Pack Comments:   Reason for Stopping:                    _________________________________  Joel Hurst MD  02/27/2020

## 2020-02-27 NOTE — PLAN OF CARE
Patient resting in bed, AAOx4. Medications administered as ordered. Pain to right arm 4/10, tolerable per patient. Encouraged to call with needs or concerns. Will continue to monitor.

## 2020-03-03 PROBLEM — I82.A13: Status: ACTIVE | Noted: 2020-03-03

## 2020-03-04 ENCOUNTER — OFFICE VISIT (OUTPATIENT)
Dept: HEMATOLOGY/ONCOLOGY | Facility: CLINIC | Age: 60
End: 2020-03-04
Payer: MEDICARE

## 2020-03-04 VITALS
TEMPERATURE: 98 F | DIASTOLIC BLOOD PRESSURE: 83 MMHG | WEIGHT: 145.31 LBS | OXYGEN SATURATION: 98 % | HEART RATE: 99 BPM | SYSTOLIC BLOOD PRESSURE: 131 MMHG | RESPIRATION RATE: 18 BRPM | BODY MASS INDEX: 28.37 KG/M2

## 2020-03-04 DIAGNOSIS — Z71.89 ADVANCE CARE PLANNING: ICD-10-CM

## 2020-03-04 DIAGNOSIS — I82.A13 ACUTE DEEP VEIN THROMBOSIS (DVT) OF AXILLARY VEIN OF BOTH UPPER EXTREMITIES: Primary | ICD-10-CM

## 2020-03-04 DIAGNOSIS — Z86.73 HISTORY OF STROKE: ICD-10-CM

## 2020-03-04 DIAGNOSIS — I82.623: ICD-10-CM

## 2020-03-04 DIAGNOSIS — D86.9 SARCOIDOSIS: ICD-10-CM

## 2020-03-04 PROCEDURE — 99497 ADVNCD CARE PLAN 30 MIN: CPT | Mod: S$PBB,,, | Performed by: INTERNAL MEDICINE

## 2020-03-04 PROCEDURE — 99999 PR PBB SHADOW E&M-EST. PATIENT-LVL III: ICD-10-PCS | Mod: PBBFAC,,, | Performed by: INTERNAL MEDICINE

## 2020-03-04 PROCEDURE — 99213 OFFICE O/P EST LOW 20 MIN: CPT | Mod: PBBFAC,PO | Performed by: INTERNAL MEDICINE

## 2020-03-04 PROCEDURE — 99999 PR PBB SHADOW E&M-EST. PATIENT-LVL III: CPT | Mod: PBBFAC,,, | Performed by: INTERNAL MEDICINE

## 2020-03-04 PROCEDURE — 99497 PR ADVNCD CARE PLAN 30 MIN: ICD-10-PCS | Mod: S$PBB,,, | Performed by: INTERNAL MEDICINE

## 2020-03-04 PROCEDURE — 99204 PR OFFICE/OUTPT VISIT, NEW, LEVL IV, 45-59 MIN: ICD-10-PCS | Mod: S$PBB,,, | Performed by: INTERNAL MEDICINE

## 2020-03-04 PROCEDURE — 99497 ADVNCD CARE PLAN 30 MIN: CPT | Mod: PBBFAC,PO | Performed by: INTERNAL MEDICINE

## 2020-03-04 PROCEDURE — 99204 OFFICE O/P NEW MOD 45 MIN: CPT | Mod: S$PBB,,, | Performed by: INTERNAL MEDICINE

## 2020-03-04 NOTE — LETTER
March 4, 2020      Vernell Rodriguez MD  [address]  [facility] [address]           [place] - Hematology Oncology  [address] [facility] [address]  [facility] [address]  Phone: [phone]          Patient: Nishi Pérez   MR Number: [id number]   YOB: 1960   Date of Visit: 3/4/2020       Dear Dr. Vernell Rodriguez:    Thank you for referring Nishi Pérez to me for evaluation. Attached you will find relevant portions of my assessment and plan of care.    If you have questions, please do not hesitate to call me. I look forward to following Nishi Pérez along with you.    Sincerely,    South Bernal MD    Enclosure  CC:  No Recipients    If you would like to receive this communication electronically, please contact [email or web address] or [phone] to request more information on [employer] access.    For providers and/or their staff who would like to refer a patient to [facility], please contact us through our one-stop-shop provider referral line, [facility], at [phone].    If you feel you have received this communication in error or would no longer like to receive these types of communications, please e-mail [email or web address]

## 2020-03-05 ENCOUNTER — TELEPHONE (OUTPATIENT)
Dept: HEMATOLOGY/ONCOLOGY | Facility: CLINIC | Age: 60
End: 2020-03-05

## 2020-03-05 NOTE — TELEPHONE ENCOUNTER
alexi Carter/ [facility], confirmed that pt. Had progress notes in her room.    ----- Message from Rad Richardson sent at 3/5/2020 10:43 AM CST -----  Contact: [phone] / Emma truong [employer]  Emma would like to speak with your office regarding progress notes for the patient. Please Advise.

## 2020-05-27 PROBLEM — Z79.01 CHRONIC ANTICOAGULATION: Status: ACTIVE | Noted: 2020-05-27

## 2020-05-27 NOTE — PROGRESS NOTES
PATIENT: Nishi Pérez  MRN: [id number]  DATE: 5/27/2020    Diagnosis:   1. Acute deep vein thrombosis (DVT) of axillary vein of both upper extremities    2. Abdominal wall hematoma, initial encounter    3. History of stroke    4. Sarcoidosis    5. Chronic anticoagulation      Chief Complaint: Bleeding/Bruising    Subjective:    History of Present Illness: Ms. Pérez is a 59 y.o. female who presented in March 2020 for evaluation and management of acute bilateral upper-extremity deep vein thrombosis. She was referred by Dr. Rodriguez.    - she was admitted from 2/26/20 - 2/27/20 for upper-extremity pain and swelling. [facility] physician had ordered an ultrasound on 2/24/20, which revealed a clot. Ultrasound during hospitalization (2/26/20) revealed bilateral upper-extremity deep vein thromboses. She was discharged on enoxaparin 1 mg/kg q12 hours.  - she has multiple comorbid conditions, including history of stroke, sarcoidosis. She was hospitalized in January 2020 for an ICD pocket infection with bacteremia. She had a left upper-extremity PICC through which she receiving antibiotics at the nursing home in late January / early February.  - today, she complains of fatigue, right arm swelling, dyspnea upon exertion.  - she has pernicious anemia, so she receives vitamin B12 injections monthly.  - she was on heparin for decades, but it was stopped a few years ago.  - she had surgery for congenital heart disease.  - she could not take coumadin due to poor absorption/peristalsis issues. Her INR would not remain therapeutic.    Interval history:  - she presents for a follow-up appointment for her deep vein thromboses.  - she had been receiving enoxaparin shots. However, she had development of abdominal pain. CT scan (5/13/20) revealed a hematoma measuring 15.6 x 5.5 x 17.4 cm.  - Abdominal ultrasound (5/26/20) revealed interval decrease in size of hematoma: 7.3.x 6.4 x 7.5 cm  - today, she reports the swelling has improved  somewhat, as well as the abdominal pain.  - She denies shortness of breath, chest pain, nausea, vomiting, diarrhea, constipation.    Past medical, surgical, family, and social histories have been reviewed and updated below.    Past Medical History:   Past Medical History:   Diagnosis Date    Aicd lead infection     Aortic coarctation     Bicuspid aortic valve     Bradycardia     Cardiac arrest 2001    Migraine headache     Neuromuscular dysfunction of bladder     Sarcoidosis     Stroke        Past Surgical History: No past surgical history on file.    Family History:   Family History   Problem Relation Age of Onset    Cancer Mother     Heart disease Father        Social History:  reports that she has never smoked. She has never used smokeless tobacco. She reports that she does not drink alcohol or use drugs.    Allergies:  Review of patient's allergies indicates:   Allergen Reactions    Butorphanol tartrate Other (See Comments)    Erythromycin base     Gabapentin Other (See Comments)    Carbamazepine Palpitations    Epinephrine Palpitations    Erythromycin Nausea Only    Statins-hmg-coa reductase inhibitors Rash       Medications:  Current Outpatient Medications   Medication Sig Dispense Refill    azelastine (ASTELIN) 137 mcg (0.1 %) nasal spray 1 spray 2 (two) times daily.      bisacodyL (DULCOLAX) 5 mg EC tablet Take 5 mg by mouth daily as needed.      cetirizine (ZYRTEC) 10 MG tablet Take 10 mg by mouth every evening.      clonazePAM (KLONOPIN) 1 MG tablet Take 1 mg by mouth 2 (two) times daily as needed.      clopidogreL (PLAVIX) 75 mg tablet Take 75 mg by mouth once daily.      clotrimazole (MYCELEX) 10 mg antelmo Take 10 mg by mouth 2 (two) times daily.      cyanocobalamin (VITAMIN B-12) 1000 MCG tablet 1,000 mcg every 30 days. 1ml IM monthly      cycloSPORINE (RESTASIS) 0.05 % ophthalmic emulsion 1 drop 2 (two) times daily.      enoxaparin (LOVENOX) 100 mg/mL Syrg Inject 0.7 mLs (70  mg total) into the skin every 12 (twelve) hours.      ezetimibe (ZETIA) 10 mg tablet Take 10 mg by mouth every evening.      fluticasone propionate (FLONASE) 50 mcg/actuation nasal spray 1 spray once daily.      furosemide (LASIX) 80 MG tablet Take 80 mg by mouth once daily.      hydroxychloroquine (PLAQUENIL) 200 mg tablet Take 200 mg by mouth once daily.      levothyroxine (SYNTHROID) 137 MCG Tab tablet Take 137 mcg by mouth once daily.      magnesium oxide (MAG-OX) 400 mg (241.3 mg magnesium) tablet Take 400 mg by mouth 2 (two) times daily.      omeprazole (PRILOSEC) 20 MG capsule Take 40 mg by mouth once daily.       oxyCODONE-acetaminophen (PERCOCET) 5-325 mg per tablet Take 1 tablet by mouth every 4 (four) hours as needed.      polyethylene glycol (GLYCOLAX) 17 gram/dose powder Take 17 g by mouth once daily.      potassium chloride SA (K-DUR,KLOR-CON) 20 MEQ tablet Take 1 tablet (20 mEq total) by mouth 2 (two) times daily.      promethazine (PHENERGAN) 25 MG tablet Take 25 mg by mouth every 6 (six) hours as needed.      ranitidine (ZANTAC) 300 MG tablet 300 mg every evening.      sertraline (ZOLOFT) 50 MG tablet Take 50 mg by mouth once daily.      tamsulosin (FLOMAX) 0.4 mg Cap 2 capsules once daily.      tiZANidine 2 mg Cap Take 4 mg by mouth 2 (two) times daily.      traMADol (ULTRAM) 50 mg tablet Take 100 mg by mouth once daily.      VITAMIN B COMPLEX ORAL Take 1 tablet by mouth once daily.       No current facility-administered medications for this visit.        Review of Systems   Constitutional: Positive for fatigue.   HENT: Negative for sore throat.    Eyes: Negative for visual disturbance.   Respiratory: Positive for shortness of breath. Negative for cough.    Cardiovascular: Negative for chest pain.   Gastrointestinal: Negative for abdominal pain, constipation, diarrhea, nausea and vomiting.   Genitourinary: Negative for dysuria.   Musculoskeletal: Negative for back pain.         Right arm swelling   Skin: Negative for rash.   Neurological: Negative for headaches.   Hematological: Negative for adenopathy.   Psychiatric/Behavioral: The patient is not nervous/anxious.        ECOG Performance Status:   ECOG SCORE 1-2       Objective:      Vitals:   Vitals:    05/28/20 1019   BP: 129/71   Pulse: 92   Resp: 20   Temp: 97.1 °F (36.2 °C)   TempSrc: Oral   SpO2: 97%   Weight: 62.5 kg (137 lb 12.6 oz)     BMI: Body mass index is 26.91 kg/m².    Physical Exam   Constitutional: She appears well-developed and well-nourished.   fatigued   HENT:   Head: Normocephalic and atraumatic.   Eyes: Pupils are equal, round, and reactive to light. EOM are normal.   Neck: Normal range of motion. Neck supple.   Cardiovascular: Normal rate and regular rhythm.   Pulmonary/Chest: Effort normal and breath sounds normal.   Abdominal: Soft. Bowel sounds are normal.   Musculoskeletal: Normal range of motion. She exhibits no edema.   Neurological: She is alert.   Skin: Skin is warm and dry.   Psychiatric: She has a normal mood and affect. Her behavior is normal. Judgment and thought content normal.   Nursing note and vitals reviewed.    Laboratory Data:  Labs have been reviewed.    Lab Results   Component Value Date    WBC 4.92 02/27/2020    HGB 11.4 (L) 02/27/2020    HCT 34.2 (L) 02/27/2020    MCV 92 02/27/2020     02/27/2020     Imaging:     Abdominal ultrasound (5/26/20):  - large cystic structure in left flank: 7.3.x 6.4 x 7.5 cm    CT abdomen/pelvis (5/13/20):  - Large left abdominal muscle wall hematoma (15.6 x 5.5 x 17.4cm)    Ultrasound upper extremities (2/26/20):  - Right upper extremity: Thrombosis is seen within the right subclavian, axillary, and basilic veins.  Right internal jugular, brachial, and cephalic veins are patent.  - Left upper extremity: Nonocclusive thrombus is seen within the left internal jugular vein.  Additional thrombosis is seen within the left axillary vein.  Left subclavian,  brachial, basilic, and cephalic veins are patent.    CTA chest (2/26/20):  1. No evidence of pulmonary embolism.  2. Mild cardiomegaly.  3. Small 2 mm pulmonary nodule in the left upper lobe is nonspecific.    Assessment:       1. Acute deep vein thrombosis (DVT) of axillary vein of both upper extremities    2. Abdominal wall hematoma, initial encounter    3. History of stroke    4. Sarcoidosis    5. Chronic anticoagulation         Plan:     1. Acute deep vein thrombosis of upper extremities  - I have reviewed her chart/imaging  - she was admitted from 2/26/20 - 2/27/20 for upper-extremity pain and swelling. [facility] physician had ordered an ultrasound on 2/24/20, which revealed a clot. Ultrasound during hospitalization (2/26/20) revealed bilateral upper-extremity deep vein thromboses. She was discharged on enoxaparin 1 mg/kg q12 hours.  - she has multiple reasons for deep vein thrombosis, including recent history of sepsis/infection, a left upper-extremity peripherally-inserted central catheter, comorbid conditions, relatively sedentary lifestyle.  - she had taken enoxaparin for about 2.5 months. It was discontinued on 5/13/20 after development of large abdominal hematoma.  - in terms of chronic, prophylactic anticoagulation, options would be nothing (increased risk of recurrence but decreased risk of bleeding) vs low-dose anticoagulation (apixaban 2.5mg PO BID). After discussion of risks/benefits, she is willing to proceed with apixaban.  - I have printed a prescription for apixaban 2.5mg PO BID.  - return to clinic as needed.    2. History of stroke  - she had a stroke when she was 18 and 28 years old.  - she had been on heparin therapy for several decades; heparin was discontinued a few years ago.  - unclear etiology. However, I think it is reasonable to proceed with apixaban 2.5mg PO BID as prophylaxis (for thrombosis as well as stroke).    3. Advance Care Planning     Power of   I initiated the  process of advance care planning today and explained the importance of this process to the patient.  I introduced the concept of advance directives to the patient, as well. Then the patient received detailed information about the importance of designating a Health Care Power of  (HCPOA). She was also instructed to communicate with this person about their wishes for future healthcare, should she become sick and lose decision-making capacity. The patient has not previously appointed a HCPOA. After our discussion, the patient has decided to complete a HCPOA and has appointed her daughter Stefany Jones ([phone]).        - return to clinic as needed.    South Bernal M.D.  Hematology/Oncology  [facility] - [address], [address]  [place] [address]  Phone: [phone]  Fax: [phone]

## 2020-05-28 ENCOUNTER — OFFICE VISIT (OUTPATIENT)
Dept: HEMATOLOGY/ONCOLOGY | Facility: CLINIC | Age: 60
End: 2020-05-28
Payer: MEDICARE

## 2020-05-28 VITALS
TEMPERATURE: 97 F | HEART RATE: 92 BPM | RESPIRATION RATE: 20 BRPM | WEIGHT: 137.81 LBS | DIASTOLIC BLOOD PRESSURE: 71 MMHG | OXYGEN SATURATION: 97 % | BODY MASS INDEX: 26.91 KG/M2 | SYSTOLIC BLOOD PRESSURE: 129 MMHG

## 2020-05-28 DIAGNOSIS — I82.A13 ACUTE DEEP VEIN THROMBOSIS (DVT) OF AXILLARY VEIN OF BOTH UPPER EXTREMITIES: Primary | ICD-10-CM

## 2020-05-28 DIAGNOSIS — S30.1XXA ABDOMINAL WALL HEMATOMA, INITIAL ENCOUNTER: ICD-10-CM

## 2020-05-28 DIAGNOSIS — Z79.01 CHRONIC ANTICOAGULATION: ICD-10-CM

## 2020-05-28 DIAGNOSIS — D86.9 SARCOIDOSIS: ICD-10-CM

## 2020-05-28 DIAGNOSIS — Z86.73 HISTORY OF STROKE: ICD-10-CM

## 2020-05-28 PROCEDURE — 99999 PR PBB SHADOW E&M-EST. PATIENT-LVL III: CPT | Mod: PBBFAC,,, | Performed by: INTERNAL MEDICINE

## 2020-05-28 PROCEDURE — 99214 OFFICE O/P EST MOD 30 MIN: CPT | Mod: S$PBB,,, | Performed by: INTERNAL MEDICINE

## 2020-05-28 PROCEDURE — 99213 OFFICE O/P EST LOW 20 MIN: CPT | Mod: PBBFAC,PO | Performed by: INTERNAL MEDICINE

## 2020-05-28 PROCEDURE — 99214 PR OFFICE/OUTPT VISIT, EST, LEVL IV, 30-39 MIN: ICD-10-PCS | Mod: S$PBB,,, | Performed by: INTERNAL MEDICINE

## 2020-05-28 PROCEDURE — 99999 PR PBB SHADOW E&M-EST. PATIENT-LVL III: ICD-10-PCS | Mod: PBBFAC,,, | Performed by: INTERNAL MEDICINE

## 2021-04-15 ENCOUNTER — PATIENT MESSAGE (OUTPATIENT)
Dept: RESEARCH | Facility: HOSPITAL | Age: 61
End: 2021-04-15

## 2021-04-29 ENCOUNTER — PATIENT MESSAGE (OUTPATIENT)
Dept: RESEARCH | Facility: HOSPITAL | Age: 61
End: 2021-04-29